# Patient Record
Sex: FEMALE | Race: WHITE | Employment: OTHER
[De-identification: names, ages, dates, MRNs, and addresses within clinical notes are randomized per-mention and may not be internally consistent; named-entity substitution may affect disease eponyms.]

---

## 2019-11-26 ENCOUNTER — TELEPHONE (OUTPATIENT)
Dept: OTHER | Facility: CLINIC | Age: 84
End: 2019-11-26

## 2019-11-26 ENCOUNTER — NURSE TRIAGE (OUTPATIENT)
Dept: OTHER | Facility: CLINIC | Age: 84
End: 2019-11-26

## 2021-03-29 RX ORDER — ALLOPURINOL 300 MG/1
300 TABLET ORAL DAILY
COMMUNITY

## 2021-03-29 RX ORDER — ASPIRIN 81 MG/1
81 TABLET ORAL DAILY
COMMUNITY

## 2021-03-29 RX ORDER — LANOLIN ALCOHOL/MO/W.PET/CERES
325 CREAM (GRAM) TOPICAL 2 TIMES DAILY
Status: ON HOLD | COMMUNITY
End: 2021-06-14

## 2021-03-29 RX ORDER — ACETAMINOPHEN 325 MG/1
650 TABLET ORAL EVERY 6 HOURS PRN
COMMUNITY

## 2021-03-29 RX ORDER — LISINOPRIL 20 MG/1
20 TABLET ORAL DAILY
COMMUNITY

## 2021-03-29 RX ORDER — ONDANSETRON 4 MG/1
4 TABLET, FILM COATED ORAL EVERY 8 HOURS PRN
COMMUNITY

## 2021-03-29 RX ORDER — METOPROLOL SUCCINATE 50 MG/1
50 TABLET, EXTENDED RELEASE ORAL DAILY
COMMUNITY

## 2021-03-29 RX ORDER — M-VIT,TX,IRON,MINS/CALC/FOLIC 27MG-0.4MG
1 TABLET ORAL DAILY
COMMUNITY

## 2021-03-29 RX ORDER — ROSUVASTATIN CALCIUM 5 MG/1
5 TABLET, COATED ORAL DAILY
COMMUNITY

## 2021-03-29 NOTE — PROGRESS NOTES
NPO after midnight except sip of water with heart/BP meds  Follow all instructions given by surgeon including medications to hold  Bring insurance card and photo ID  Shower the night before or morning of procedure with liquid antibacterial soap  Wear comfortable clothing  Do not bring jewelry or valuables  Bring list of medications with dosage and how often taken if not reviewed   needed at discharge at least 25years old  Call PAT at [phone] for questions    Instructed to call surgery center at [phone] upon arrival to speak with  before entering building. Covid screen due  at [facility] 6 to 7 days before procedure. Pt plans to have completed on 3/29 at [facility] - will send out - requested they fax us results. Fax number for PAT given. Covid screening questionnaire complete and negative for symptoms or exposure see chart for documentation     Requested nurse caring for pt morning of surgery send current MAR with last doses of medications listed. Kendal Alexandra RN voiced understanding.

## 2021-03-30 NOTE — PROGRESS NOTES
Ailin at [address] [place] [address],[address] office informed of Hgb 9.4 and Hct 29.9 drawn on 3/12/21. Stated she would inform DR Olidna Arthur of these levels.

## 2021-03-30 NOTE — PROGRESS NOTES
Spoke with pt's son Dani Leyden who stated that [address] home will transport pt to and from surgery on 4/5/21. Stated that he and his sister Timo Ch would be here with their mother as well. Corey Ear to call [phone]  upon arrival to the Surgery Center. Voiced understanding.

## 2021-04-05 ENCOUNTER — ANESTHESIA EVENT (OUTPATIENT)
Dept: OPERATING ROOM | Age: 86
End: 2021-04-05
Payer: MEDICARE

## 2021-04-05 ENCOUNTER — ANESTHESIA (OUTPATIENT)
Dept: OPERATING ROOM | Age: 86
End: 2021-04-05
Payer: MEDICARE

## 2021-04-05 ENCOUNTER — HOSPITAL ENCOUNTER (OUTPATIENT)
Age: 86
Setting detail: OUTPATIENT SURGERY
Discharge: NURSING, REHAB OR LONG-TERM CARE | End: 2021-04-05
Attending: SPECIALIST | Admitting: SPECIALIST
Payer: MEDICARE

## 2021-04-05 VITALS
SYSTOLIC BLOOD PRESSURE: 187 MMHG | OXYGEN SATURATION: 100 % | DIASTOLIC BLOOD PRESSURE: 81 MMHG | RESPIRATION RATE: 13 BRPM

## 2021-04-05 VITALS
HEART RATE: 75 BPM | BODY MASS INDEX: 21.97 KG/M2 | WEIGHT: 109 LBS | OXYGEN SATURATION: 93 % | HEIGHT: 59 IN | TEMPERATURE: 97 F | SYSTOLIC BLOOD PRESSURE: 149 MMHG | RESPIRATION RATE: 17 BRPM | DIASTOLIC BLOOD PRESSURE: 67 MMHG

## 2021-04-05 PROCEDURE — 6360000002 HC RX W HCPCS: Performed by: NURSE ANESTHETIST, CERTIFIED REGISTERED

## 2021-04-05 PROCEDURE — 3700000001 HC ADD 15 MINUTES (ANESTHESIA): Performed by: SPECIALIST

## 2021-04-05 PROCEDURE — 2500000003 HC RX 250 WO HCPCS: Performed by: SPECIALIST

## 2021-04-05 PROCEDURE — 3700000000 HC ANESTHESIA ATTENDED CARE: Performed by: SPECIALIST

## 2021-04-05 PROCEDURE — 7100000000 HC PACU RECOVERY - FIRST 15 MIN: Performed by: SPECIALIST

## 2021-04-05 PROCEDURE — 2500000003 HC RX 250 WO HCPCS: Performed by: NURSE ANESTHETIST, CERTIFIED REGISTERED

## 2021-04-05 PROCEDURE — 3600000003 HC SURGERY LEVEL 3 BASE: Performed by: SPECIALIST

## 2021-04-05 PROCEDURE — 6370000000 HC RX 637 (ALT 250 FOR IP): Performed by: ANESTHESIOLOGY

## 2021-04-05 PROCEDURE — 2500000003 HC RX 250 WO HCPCS: Performed by: ANESTHESIOLOGY

## 2021-04-05 PROCEDURE — 7100000011 HC PHASE II RECOVERY - ADDTL 15 MIN: Performed by: SPECIALIST

## 2021-04-05 PROCEDURE — 3600000013 HC SURGERY LEVEL 3 ADDTL 15MIN: Performed by: SPECIALIST

## 2021-04-05 PROCEDURE — 2580000003 HC RX 258: Performed by: SPECIALIST

## 2021-04-05 PROCEDURE — 7100000010 HC PHASE II RECOVERY - FIRST 15 MIN: Performed by: SPECIALIST

## 2021-04-05 PROCEDURE — 7100000001 HC PACU RECOVERY - ADDTL 15 MIN: Performed by: SPECIALIST

## 2021-04-05 PROCEDURE — 2709999900 HC NON-CHARGEABLE SUPPLY: Performed by: SPECIALIST

## 2021-04-05 RX ORDER — SUCCINYLCHOLINE/SOD CL,ISO/PF 200MG/10ML
SYRINGE (ML) INTRAVENOUS PRN
Status: DISCONTINUED | OUTPATIENT
Start: 2021-04-05 | End: 2021-04-05 | Stop reason: SDUPTHER

## 2021-04-05 RX ORDER — FENTANYL CITRATE 50 UG/ML
50 INJECTION, SOLUTION INTRAMUSCULAR; INTRAVENOUS EVERY 5 MIN PRN
Status: DISCONTINUED | OUTPATIENT
Start: 2021-04-05 | End: 2021-04-05 | Stop reason: HOSPADM

## 2021-04-05 RX ORDER — LIDOCAINE HYDROCHLORIDE 20 MG/ML
INJECTION, SOLUTION EPIDURAL; INFILTRATION; INTRACAUDAL; PERINEURAL PRN
Status: DISCONTINUED | OUTPATIENT
Start: 2021-04-05 | End: 2021-04-05 | Stop reason: SDUPTHER

## 2021-04-05 RX ORDER — FENTANYL CITRATE 50 UG/ML
INJECTION, SOLUTION INTRAMUSCULAR; INTRAVENOUS PRN
Status: DISCONTINUED | OUTPATIENT
Start: 2021-04-05 | End: 2021-04-05 | Stop reason: SDUPTHER

## 2021-04-05 RX ORDER — DOCUSATE SODIUM 100 MG/1
100 CAPSULE, LIQUID FILLED ORAL 2 TIMES DAILY
COMMUNITY

## 2021-04-05 RX ORDER — SODIUM CHLORIDE 9 MG/ML
INJECTION, SOLUTION INTRAVENOUS ONCE
Status: COMPLETED | OUTPATIENT
Start: 2021-04-05 | End: 2021-04-05

## 2021-04-05 RX ORDER — PROMETHAZINE HYDROCHLORIDE 25 MG/ML
12.5 INJECTION, SOLUTION INTRAMUSCULAR; INTRAVENOUS
Status: DISCONTINUED | OUTPATIENT
Start: 2021-04-05 | End: 2021-04-05 | Stop reason: HOSPADM

## 2021-04-05 RX ORDER — DEXAMETHASONE SODIUM PHOSPHATE 10 MG/ML
INJECTION, EMULSION INTRAMUSCULAR; INTRAVENOUS PRN
Status: DISCONTINUED | OUTPATIENT
Start: 2021-04-05 | End: 2021-04-05 | Stop reason: SDUPTHER

## 2021-04-05 RX ORDER — LIDOCAINE HYDROCHLORIDE AND EPINEPHRINE 10; 10 MG/ML; UG/ML
INJECTION, SOLUTION INFILTRATION; PERINEURAL PRN
Status: DISCONTINUED | OUTPATIENT
Start: 2021-04-05 | End: 2021-04-05 | Stop reason: ALTCHOICE

## 2021-04-05 RX ORDER — LABETALOL 20 MG/4 ML (5 MG/ML) INTRAVENOUS SYRINGE
10 EVERY 10 MIN PRN
Status: DISCONTINUED | OUTPATIENT
Start: 2021-04-05 | End: 2021-04-05 | Stop reason: HOSPADM

## 2021-04-05 RX ORDER — DOXYCYCLINE HYCLATE 50 MG/1
324 CAPSULE, GELATIN COATED ORAL
Status: ON HOLD | COMMUNITY
End: 2021-06-14

## 2021-04-05 RX ORDER — CEFAZOLIN SODIUM 1 G/3ML
INJECTION, POWDER, FOR SOLUTION INTRAMUSCULAR; INTRAVENOUS PRN
Status: DISCONTINUED | OUTPATIENT
Start: 2021-04-05 | End: 2021-04-05 | Stop reason: SDUPTHER

## 2021-04-05 RX ORDER — FENTANYL CITRATE 50 UG/ML
25 INJECTION, SOLUTION INTRAMUSCULAR; INTRAVENOUS EVERY 5 MIN PRN
Status: DISCONTINUED | OUTPATIENT
Start: 2021-04-05 | End: 2021-04-05 | Stop reason: HOSPADM

## 2021-04-05 RX ORDER — PROPOFOL 10 MG/ML
INJECTION, EMULSION INTRAVENOUS PRN
Status: DISCONTINUED | OUTPATIENT
Start: 2021-04-05 | End: 2021-04-05 | Stop reason: SDUPTHER

## 2021-04-05 RX ORDER — CLINDAMYCIN HYDROCHLORIDE 300 MG/1
300 CAPSULE ORAL 3 TIMES DAILY
Status: ON HOLD | COMMUNITY
End: 2021-06-14

## 2021-04-05 RX ORDER — LANOLIN ALCOHOL/MO/W.PET/CERES
3 CREAM (GRAM) TOPICAL DAILY
COMMUNITY

## 2021-04-05 RX ORDER — SCOLOPAMINE TRANSDERMAL SYSTEM 1 MG/1
1 PATCH, EXTENDED RELEASE TRANSDERMAL ONCE
Status: DISCONTINUED | OUTPATIENT
Start: 2021-04-05 | End: 2021-04-05 | Stop reason: HOSPADM

## 2021-04-05 RX ORDER — ONDANSETRON 2 MG/ML
INJECTION INTRAMUSCULAR; INTRAVENOUS PRN
Status: DISCONTINUED | OUTPATIENT
Start: 2021-04-05 | End: 2021-04-05 | Stop reason: SDUPTHER

## 2021-04-05 RX ADMIN — Medication 100 MG: at 11:33

## 2021-04-05 RX ADMIN — FENTANYL CITRATE 25 MCG: 50 INJECTION, SOLUTION INTRAMUSCULAR; INTRAVENOUS at 12:07

## 2021-04-05 RX ADMIN — DEXAMETHASONE SODIUM PHOSPHATE 8 MG: 10 INJECTION, EMULSION INTRAMUSCULAR; INTRAVENOUS at 11:57

## 2021-04-05 RX ADMIN — PROPOFOL 40 MG: 10 INJECTION, EMULSION INTRAVENOUS at 12:04

## 2021-04-05 RX ADMIN — FENTANYL CITRATE 25 MCG: 50 INJECTION, SOLUTION INTRAMUSCULAR; INTRAVENOUS at 11:29

## 2021-04-05 RX ADMIN — LABETALOL 20 MG/4 ML (5 MG/ML) INTRAVENOUS SYRINGE 10 MG: at 12:38

## 2021-04-05 RX ADMIN — LIDOCAINE HYDROCHLORIDE 60 MG: 20 INJECTION, SOLUTION EPIDURAL; INFILTRATION; INTRACAUDAL; PERINEURAL at 11:32

## 2021-04-05 RX ADMIN — ONDANSETRON HYDROCHLORIDE 4 MG: 4 INJECTION, SOLUTION INTRAMUSCULAR; INTRAVENOUS at 11:57

## 2021-04-05 RX ADMIN — FENTANYL CITRATE 25 MCG: 50 INJECTION, SOLUTION INTRAMUSCULAR; INTRAVENOUS at 11:46

## 2021-04-05 RX ADMIN — CEFAZOLIN 2000 MG: 1 INJECTION, POWDER, FOR SOLUTION INTRAMUSCULAR; INTRAVENOUS; PARENTERAL at 11:39

## 2021-04-05 RX ADMIN — SODIUM CHLORIDE: 9 INJECTION, SOLUTION INTRAVENOUS at 11:26

## 2021-04-05 RX ADMIN — PROPOFOL 70 MG: 10 INJECTION, EMULSION INTRAVENOUS at 11:33

## 2021-04-05 RX ADMIN — FENTANYL CITRATE 25 MCG: 50 INJECTION, SOLUTION INTRAMUSCULAR; INTRAVENOUS at 12:23

## 2021-04-05 ASSESSMENT — PULMONARY FUNCTION TESTS
PIF_VALUE: 14
PIF_VALUE: 15
PIF_VALUE: 60
PIF_VALUE: 20
PIF_VALUE: 15
PIF_VALUE: 0
PIF_VALUE: 1
PIF_VALUE: 15
PIF_VALUE: 0
PIF_VALUE: 15
PIF_VALUE: 9
PIF_VALUE: 15
PIF_VALUE: 1
PIF_VALUE: 15
PIF_VALUE: 3
PIF_VALUE: 15
PIF_VALUE: 13

## 2021-04-05 ASSESSMENT — PAIN - FUNCTIONAL ASSESSMENT: PAIN_FUNCTIONAL_ASSESSMENT: 0-10

## 2021-04-05 NOTE — PROGRESS NOTES
1234: Patient to Phase I Recovery via bed in stable condition on non-rebreather mask. BP: 200/86, HR: 79, RR: 13, SPO2: 100%, Temp: 97. Head wrap in place. Clean, dry, & intact at this time. IV infusing. SCDs on bilateral lower extremities. 1238: BP: 204/90 - 10mg IV Labetalol given. 1242: BP: 199/85    1245: BP: 159/69, HR: 76, RR: 14, SPO2: 96% on 2L nasal cannula. 1250: BP: 149/68, HR: 74, RR: 15, SPO2: 97% on 1L nasal cannula. 1251: Patient on room air at this time. Patient coughing up sputum, but swallowing it. Patient alert & oriented x4. Denies pain at this time. Hand grasps equal bilaterally. Respirations equal and unlabored. Rhonchi present. Patient coughing. Pedal push/pull intact. Right side weaker than the left. 1255: Patient resting at this time. States pain is a 5/10 on her head, but states it is tolerable. BP: 160/69, HR: 75, RR: 14, SPO2: 95% on room air. 1300:  BP: 150/68, HR: 74, RR: 14, SPO2: 95% on room air.    1305: BP: 149/67, HR: 75, RR: 17, SPO2: 93%    1308: Patient to Phase II Recovery in stable condition on room air via bed at this time.

## 2021-04-05 NOTE — ANESTHESIA PRE PROCEDURE
Department of Anesthesiology  Preprocedure Note       Name:  Aleja Luo   Age:  80 y.o.  :  1933                                          MRN:  [id number]         Date:  2021      Surgeon: Oscar Mahoney):  Robert Heller MD    Procedure: Procedure(s):  SCALP REPAIR FLAP    Medications prior to admission:   Prior to Admission medications    Medication Sig Start Date End Date Taking?  Authorizing Provider   docusate sodium (COLACE) 100 MG capsule Take 100 mg by mouth 2 times daily   Yes Historical Provider, MD   melatonin 3 MG TABS tablet Take 3 mg by mouth daily   Yes Historical Provider, MD   ferrous gluconate (FERGON) 324 (38 Fe) MG tablet Take 324 mg by mouth daily (with breakfast)   Yes Historical Provider, MD   clindamycin (CLEOCIN) 300 MG capsule Take 300 mg by mouth 3 times daily   Yes Historical Provider, MD   allopurinol (ZYLOPRIM) 300 MG tablet Take 300 mg by mouth daily   Yes Historical Provider, MD   aspirin 81 MG EC tablet Take 81 mg by mouth daily   Yes Historical Provider, MD   Multiple Vitamins-Minerals (THERAPEUTIC MULTIVITAMIN-MINERALS) tablet Take 1 tablet by mouth daily   Yes Historical Provider, MD   ferrous sulfate (FE TABS 325) 325 (65 Fe) MG EC tablet Take 325 mg by mouth 2 times daily   Yes Historical Provider, MD   lisinopril (PRINIVIL;ZESTRIL) 20 MG tablet Take 20 mg by mouth daily   Yes Historical Provider, MD   metoprolol succinate (TOPROL XL) 50 MG extended release tablet Take 50 mg by mouth daily   Yes Historical Provider, MD   Omeprazole 20 MG TBDD Take 20 mg by mouth daily   Yes Historical Provider, MD   rosuvastatin (CRESTOR) 5 MG tablet Take 5 mg by mouth daily   Yes Historical Provider, MD   acetaminophen (TYLENOL) 325 MG tablet Take 650 mg by mouth every 6 hours as needed for Pain   Yes Historical Provider, MD   ondansetron (ZOFRAN) 4 MG tablet Take 4 mg by mouth every 8 hours as needed for Nausea or Vomiting   Yes Historical Provider, MD       Current medications: Current Facility-Administered Medications   Medication Dose Route Frequency Provider Last Rate Last Admin    0.9 % sodium chloride infusion   Intravenous Once Charito Ojeda MD        ceFAZolin (ANCEF) 2000 mg in dextrose 5 % 50 mL IVPB  2,000 mg Intravenous 60 Min Pre-Op Charito Ojeda MD        scopolamine (TRANSDERM-SCOP) transdermal patch 1 patch  1 patch Transdermal Once Flori Epps DO           Allergies: Allergies   Allergen Reactions    Morphine Anaphylaxis     Not sure of reaction        Problem List:  There is no problem list on file for this patient. Past Medical History:        Diagnosis Date    Arthritis     Cancer ([address] 2021    skin     GERD (gastroesophageal reflux disease)     Hyperlipidemia     Hypertension     Scoliosis        Past Surgical History:        Procedure Laterality Date    FRACTURE SURGERY Right     hip     FRACTURE SURGERY Left     femur    HERNIA REPAIR         Social History:    Social History     Tobacco Use    Smoking status: Not on file   Substance Use Topics    Alcohol use: Not Currently                                Counseling given: Not Answered      Vital Signs (Current):   Vitals:    03/29/21 1434 03/30/21 1014 04/05/21 1013   BP:   (!) 173/77   Pulse:   77   Resp:   16   Temp:   97.1 °F (36.2 °C)   TempSrc:   Temporal   SpO2:   95%   Weight: 109 lb (49.4 kg)  109 lb (49.4 kg)   Height:  4' 11\" (1.499 m) 4' 11\" (1.499 m)                                              BP Readings from Last 3 Encounters:   04/05/21 (!) 173/77       NPO Status: Time of last liquid consumption: 1800                        Time of last solid consumption: 1800                        Date of last liquid consumption: 04/04/21                        Date of last solid food consumption: 04/04/21    BMI:   Wt Readings from Last 3 Encounters:   04/05/21 109 lb (49.4 kg)     Body mass index is 22.02 kg/m².     CBC: No results found for: WBC, RBC, HGB, HCT, MCV, RDW, PLT    CMP: No results found for: NA, K, CL, CO2, BUN, CREATININE, GFRAA, AGRATIO, LABGLOM, GLUCOSE, PROT, CALCIUM, BILITOT, ALKPHOS, AST, ALT    POC Tests: No results for input(s): POCGLU, POCNA, POCK, POCCL, POCBUN, POCHEMO, POCHCT in the last 72 hours. Coags: No results found for: PROTIME, INR, APTT    HCG (If Applicable): No results found for: PREGTESTUR, PREGSERUM, HCG, HCGQUANT     ABGs: No results found for: PHART, PO2ART, YPZ8AUT, DDX1AUI, BEART, W1ZWQAFS     Type & Screen (If Applicable):  No results found for: LABABO, LABRH    Drug/Infectious Status (If Applicable):  No results found for: HIV, HEPCAB    COVID-19 Screening (If Applicable): No results found for: COVID19        Anesthesia Evaluation  Patient summary reviewed  Airway: Mallampati: III  TM distance: >3 FB   Neck ROM: full  Mouth opening: > = 3 FB Dental:    (+) partials      Pulmonary:                              Cardiovascular:    (+) hypertension:,       ECG reviewed                        Neuro/Psych:               GI/Hepatic/Renal:            ROS comment: Denies any sxs of gerd. Endo/Other:                     Abdominal:           Vascular:                                        Anesthesia Plan      general     ASA 2       Induction: intravenous. MIPS: Postoperative opioids intended and Prophylactic antiemetics administered. Anesthetic plan and risks discussed with patient and child/children. Plan discussed with LIZZY. Juan Manuel Gomes.  [facility]   4/5/2021

## 2021-04-05 NOTE — PROGRESS NOTES
1308-  Report received from [facility]. Patient given blueberry muffin and water. 1310-  Family brought to room. 1320-  All belongings in room. Dr. Carmina Hough in room talking with patient and family. 1330-  Report called to Nursing home. All questions answered. 1340-  Patient tolerating snack and drink. Denies pain. 1350-  IV removed-- no complications. Bandage applied. 1400-  This RN and Carlo Stacy RN changed patient's depends and pants. 1410-  Patient dressed. 1415-  Discharge instructions given to nurse and family. No further questions.     1418-  Patient discharged in stable condition with all belongings via wheelchair

## 2021-04-05 NOTE — OP NOTE
Operative Note    Patient name: Jia Xavier Record Number: [id number]    Primary Care Physician: No primary care provider on file.  1933    Date of Procedure: 2021    Pre-operative Diagnosis: 20cm2 defect of anterior scalp wound s/p MOHS for squamous cell carcinoma    Post-operative Diagnosis: Same    Procedure Performed: Repair of anterior scalp defect with an adjacent tissue transfer (150 cm2) (CPT 38286, 14302 x 3)    Surgeons/Assistants: Dr. Sylvia Hay PA-C    Estimated Blood Loss: 5ml     Complications: none immediately appreciated    Procedure: With the patient lying in the supine position and under adequate anesthesia per the anesthesia team, the area was anesthetized with a total of 19 ml of 1% Lidocaine 1:100,000 with epinephrine solution. The area was then prepped and draped in the standard surgical fashion. There was a very sizeable defect with exposed cranium from previous operation, which could not be closed primarily. Therefore, an adjacent tissue transfer (bilateral rotation flaps) were then designed, elevated and inset with skin staples. The Burrow's triangles were resected to prevent dog-ear deformity and final closure was completed using bacitracin, xeroform with bulky sterile head wrap. The patient tolerated the procedure quite well and remained hemodynamically stable throughout the procedure and was quite comfortable throughout the operative course.     Clinical staging for cancer cases:  Ct  Cn  Cm    Pauline العلي  Electronically signed by me on 2021 at 12:28 PM  Operative Note      Patient: Mil Lozano  YOB: 1933  MRN: [id number]    Date of Procedure: 2021    Pre-Op Diagnosis: OPEN WOUND OF THE SCALP    Post-Op Diagnosis: Same       Procedure(s):  SCALP REPAIR FLAP    Surgeon(s):  Pauline العلي MD    Assistant:   Physician Assistant: Adilene Cortes PA-C    Anesthesia: General    Estimated Blood Loss (mL): Minimal    Complications: None    Specimens:   * No specimens in log *    Implants:  * No implants in log *      Drains: * No LDAs found *    Findings: 20cm2 defect of anterior scalp wound s/p MOHS for squamous cell carcinoma      Detailed Description of Procedure:   Repair of anterior scalp defect with an adjacent tissue transfer (150 cm2) (CPT 49172, 14302 x 3)    Electronically signed by Tarik Smith MD on 4/5/2021 at 12:28 PM

## 2021-04-05 NOTE — H&P
[facility]  History and Physical Update    Pt Name: Raleigh Blanco  MRN: [id number]  YOB: 1933  Date of evaluation: 4/5/2021    I have examined the patient and reviewed the H&P/Consult and there are no changes to the patient or plans.       Luis Antonio Blackwell  Electronically signed 4/5/2021 at 7:08 AM

## 2021-04-05 NOTE — ANESTHESIA POSTPROCEDURE EVALUATION
Department of Anesthesiology  Postprocedure Note    Patient: Tod Reeder  MRN: [id number]  YOB: 1933  Date of evaluation: 4/5/2021  Time:  1:09 PM     Procedure Summary     Date: 04/05/21 Room / Location: [address] [place] [address] 04 / [facility]    Anesthesia Start: 0696 Anesthesia Stop: 3307    Procedure: SCALP REPAIR FLAP (N/A ) Diagnosis: (OPEN WOUND OF THE SCALP)    Surgeons: Mitchel Aguila MD Responsible Provider: Yvonne Elias DO    Anesthesia Type: general ASA Status: 2          Anesthesia Type: general    Heydi Phase I: Heydi Score: 10    Heydi Phase II:      Last vitals: Reviewed and per EMR flowsheets.        Anesthesia Post Evaluation    Patient location during evaluation: floor  Patient participation: complete - patient participated  Level of consciousness: awake  Airway patency: patent  Nausea & Vomiting: no vomiting and no nausea  Cardiovascular status: hemodynamically stable  Respiratory status: acceptable  Hydration status: stable

## 2021-06-04 NOTE — PROGRESS NOTES
PAT:    Spoke with Greg Lo at [place] [address]. NPO after midnight except sip of water with heart/BP meds  Follow all instructions given by surgeon including medications to hold  Bring insurance card and photo ID  Shower the night before or morning of procedure with liquid antibacterial soap  Wear comfortable clothing  Do not bring jewelry or valuables  Bring list of medications with dosage and how often taken if not reviewed   needed at discharge at least 25years old  Call PAT at [phone] for questions    Instructed to call surgery center at [phone] upon arrival to speak with  before entering building. Covid screen due  at [facility] 6 to 7 days before procedure. Pt fully vaccinated and will not be tested. Covid screening questionnaire complete and negative for symptoms or exposure see chart for documentation    Spoke with pt's son Caitlyn Barba - he will be present the day of surgery. Questions answered. Voiced understanding.

## 2021-06-08 NOTE — PROGRESS NOTES
EKG clearance form given to Dr. Rosalina Luna for review. OK to proceed with surgery as planned at surgery center.

## 2021-06-14 ENCOUNTER — HOSPITAL ENCOUNTER (OUTPATIENT)
Age: 86
Setting detail: OUTPATIENT SURGERY
Discharge: OTHER INSTITUTION | End: 2021-06-14
Attending: SPECIALIST | Admitting: SPECIALIST
Payer: MEDICARE

## 2021-06-14 ENCOUNTER — ANESTHESIA EVENT (OUTPATIENT)
Dept: OPERATING ROOM | Age: 86
End: 2021-06-14
Payer: MEDICARE

## 2021-06-14 ENCOUNTER — ANESTHESIA (OUTPATIENT)
Dept: OPERATING ROOM | Age: 86
End: 2021-06-14
Payer: MEDICARE

## 2021-06-14 VITALS
TEMPERATURE: 98.6 F | OXYGEN SATURATION: 97 % | SYSTOLIC BLOOD PRESSURE: 150 MMHG | BODY MASS INDEX: 21.6 KG/M2 | HEART RATE: 83 BPM | WEIGHT: 110 LBS | HEIGHT: 60 IN | RESPIRATION RATE: 15 BRPM | DIASTOLIC BLOOD PRESSURE: 72 MMHG

## 2021-06-14 VITALS
SYSTOLIC BLOOD PRESSURE: 209 MMHG | OXYGEN SATURATION: 100 % | RESPIRATION RATE: 17 BRPM | DIASTOLIC BLOOD PRESSURE: 91 MMHG

## 2021-06-14 PROCEDURE — 6370000000 HC RX 637 (ALT 250 FOR IP): Performed by: PHYSICIAN ASSISTANT

## 2021-06-14 PROCEDURE — 3600000012 HC SURGERY LEVEL 2 ADDTL 15MIN: Performed by: SPECIALIST

## 2021-06-14 PROCEDURE — 6360000002 HC RX W HCPCS: Performed by: NURSE ANESTHETIST, CERTIFIED REGISTERED

## 2021-06-14 PROCEDURE — 2500000003 HC RX 250 WO HCPCS: Performed by: NURSE ANESTHETIST, CERTIFIED REGISTERED

## 2021-06-14 PROCEDURE — 7100000001 HC PACU RECOVERY - ADDTL 15 MIN: Performed by: SPECIALIST

## 2021-06-14 PROCEDURE — 3600000002 HC SURGERY LEVEL 2 BASE: Performed by: SPECIALIST

## 2021-06-14 PROCEDURE — 2709999900 HC NON-CHARGEABLE SUPPLY: Performed by: SPECIALIST

## 2021-06-14 PROCEDURE — 2500000003 HC RX 250 WO HCPCS: Performed by: SPECIALIST

## 2021-06-14 PROCEDURE — 3700000001 HC ADD 15 MINUTES (ANESTHESIA): Performed by: SPECIALIST

## 2021-06-14 PROCEDURE — 3700000000 HC ANESTHESIA ATTENDED CARE: Performed by: SPECIALIST

## 2021-06-14 PROCEDURE — 6360000002 HC RX W HCPCS: Performed by: ANESTHESIOLOGY

## 2021-06-14 PROCEDURE — 7100000010 HC PHASE II RECOVERY - FIRST 15 MIN: Performed by: SPECIALIST

## 2021-06-14 PROCEDURE — 2580000003 HC RX 258: Performed by: SPECIALIST

## 2021-06-14 PROCEDURE — 7100000011 HC PHASE II RECOVERY - ADDTL 15 MIN: Performed by: SPECIALIST

## 2021-06-14 PROCEDURE — 7100000000 HC PACU RECOVERY - FIRST 15 MIN: Performed by: SPECIALIST

## 2021-06-14 PROCEDURE — 6360000002 HC RX W HCPCS: Performed by: SPECIALIST

## 2021-06-14 RX ORDER — PROPOFOL 10 MG/ML
INJECTION, EMULSION INTRAVENOUS PRN
Status: DISCONTINUED | OUTPATIENT
Start: 2021-06-14 | End: 2021-06-14 | Stop reason: SDUPTHER

## 2021-06-14 RX ORDER — ONDANSETRON 2 MG/ML
INJECTION INTRAMUSCULAR; INTRAVENOUS PRN
Status: DISCONTINUED | OUTPATIENT
Start: 2021-06-14 | End: 2021-06-14 | Stop reason: SDUPTHER

## 2021-06-14 RX ORDER — FENTANYL CITRATE 50 UG/ML
INJECTION, SOLUTION INTRAMUSCULAR; INTRAVENOUS
Status: DISCONTINUED
Start: 2021-06-14 | End: 2021-06-14 | Stop reason: HOSPADM

## 2021-06-14 RX ORDER — LIDOCAINE HYDROCHLORIDE AND EPINEPHRINE 10; 10 MG/ML; UG/ML
INJECTION, SOLUTION INFILTRATION; PERINEURAL PRN
Status: DISCONTINUED | OUTPATIENT
Start: 2021-06-14 | End: 2021-06-14 | Stop reason: ALTCHOICE

## 2021-06-14 RX ORDER — SODIUM CHLORIDE 9 MG/ML
INJECTION, SOLUTION INTRAVENOUS CONTINUOUS
Status: DISCONTINUED | OUTPATIENT
Start: 2021-06-14 | End: 2021-06-14 | Stop reason: HOSPADM

## 2021-06-14 RX ORDER — DEXAMETHASONE SODIUM PHOSPHATE 10 MG/ML
INJECTION, EMULSION INTRAMUSCULAR; INTRAVENOUS PRN
Status: DISCONTINUED | OUTPATIENT
Start: 2021-06-14 | End: 2021-06-14 | Stop reason: SDUPTHER

## 2021-06-14 RX ORDER — FENTANYL CITRATE 50 UG/ML
50 INJECTION, SOLUTION INTRAMUSCULAR; INTRAVENOUS ONCE
Status: COMPLETED | OUTPATIENT
Start: 2021-06-14 | End: 2021-06-14

## 2021-06-14 RX ORDER — SUCCINYLCHOLINE/SOD CL,ISO/PF 200MG/10ML
SYRINGE (ML) INTRAVENOUS PRN
Status: DISCONTINUED | OUTPATIENT
Start: 2021-06-14 | End: 2021-06-14 | Stop reason: SDUPTHER

## 2021-06-14 RX ORDER — TRAMADOL HYDROCHLORIDE 50 MG/1
50 TABLET ORAL ONCE
Status: COMPLETED | OUTPATIENT
Start: 2021-06-14 | End: 2021-06-14

## 2021-06-14 RX ORDER — FENTANYL CITRATE 50 UG/ML
INJECTION, SOLUTION INTRAMUSCULAR; INTRAVENOUS PRN
Status: DISCONTINUED | OUTPATIENT
Start: 2021-06-14 | End: 2021-06-14 | Stop reason: SDUPTHER

## 2021-06-14 RX ORDER — LIDOCAINE HYDROCHLORIDE 20 MG/ML
INJECTION, SOLUTION EPIDURAL; INFILTRATION; INTRACAUDAL; PERINEURAL PRN
Status: DISCONTINUED | OUTPATIENT
Start: 2021-06-14 | End: 2021-06-14 | Stop reason: SDUPTHER

## 2021-06-14 RX ORDER — ROCURONIUM BROMIDE 10 MG/ML
INJECTION, SOLUTION INTRAVENOUS PRN
Status: DISCONTINUED | OUTPATIENT
Start: 2021-06-14 | End: 2021-06-14 | Stop reason: SDUPTHER

## 2021-06-14 RX ADMIN — ONDANSETRON HYDROCHLORIDE 4 MG: 4 INJECTION, SOLUTION INTRAMUSCULAR; INTRAVENOUS at 11:19

## 2021-06-14 RX ADMIN — TRAMADOL HYDROCHLORIDE 50 MG: 50 TABLET ORAL at 13:32

## 2021-06-14 RX ADMIN — Medication 100 MG: at 10:59

## 2021-06-14 RX ADMIN — SUGAMMADEX 200 MG: 100 INJECTION, SOLUTION INTRAVENOUS at 11:27

## 2021-06-14 RX ADMIN — FENTANYL CITRATE 25 MCG: 50 INJECTION, SOLUTION INTRAMUSCULAR; INTRAVENOUS at 11:31

## 2021-06-14 RX ADMIN — FENTANYL CITRATE 50 MCG: 50 INJECTION, SOLUTION INTRAMUSCULAR; INTRAVENOUS at 11:43

## 2021-06-14 RX ADMIN — FENTANYL CITRATE 25 MCG: 50 INJECTION, SOLUTION INTRAMUSCULAR; INTRAVENOUS at 11:05

## 2021-06-14 RX ADMIN — SODIUM CHLORIDE: 9 INJECTION, SOLUTION INTRAVENOUS at 10:54

## 2021-06-14 RX ADMIN — ROCURONIUM BROMIDE 15 MG: 10 INJECTION INTRAVENOUS at 11:05

## 2021-06-14 RX ADMIN — CEFAZOLIN SODIUM 2000 MG: 10 INJECTION, POWDER, FOR SOLUTION INTRAVENOUS at 11:06

## 2021-06-14 RX ADMIN — LIDOCAINE HYDROCHLORIDE 60 MG: 20 INJECTION, SOLUTION EPIDURAL; INFILTRATION; INTRACAUDAL; PERINEURAL at 10:57

## 2021-06-14 RX ADMIN — PROPOFOL 100 MG: 10 INJECTION, EMULSION INTRAVENOUS at 10:58

## 2021-06-14 RX ADMIN — DEXAMETHASONE SODIUM PHOSPHATE 10 MG: 10 INJECTION, EMULSION INTRAMUSCULAR; INTRAVENOUS at 11:15

## 2021-06-14 RX ADMIN — FENTANYL CITRATE 25 MCG: 50 INJECTION, SOLUTION INTRAMUSCULAR; INTRAVENOUS at 11:16

## 2021-06-14 RX ADMIN — FENTANYL CITRATE 25 MCG: 50 INJECTION, SOLUTION INTRAMUSCULAR; INTRAVENOUS at 10:55

## 2021-06-14 ASSESSMENT — PULMONARY FUNCTION TESTS
PIF_VALUE: 1
PIF_VALUE: 15
PIF_VALUE: 16
PIF_VALUE: 14
PIF_VALUE: 4
PIF_VALUE: 9
PIF_VALUE: 1
PIF_VALUE: 1
PIF_VALUE: 4
PIF_VALUE: 17
PIF_VALUE: 16
PIF_VALUE: 14
PIF_VALUE: 16
PIF_VALUE: 17
PIF_VALUE: 1
PIF_VALUE: 3
PIF_VALUE: 12
PIF_VALUE: 17
PIF_VALUE: 17
PIF_VALUE: 16
PIF_VALUE: 14
PIF_VALUE: 17
PIF_VALUE: 17
PIF_VALUE: 16
PIF_VALUE: 18
PIF_VALUE: 15
PIF_VALUE: 4
PIF_VALUE: 16
PIF_VALUE: 1
PIF_VALUE: 16
PIF_VALUE: 17
PIF_VALUE: 14
PIF_VALUE: 15
PIF_VALUE: 1
PIF_VALUE: 17
PIF_VALUE: 17
PIF_VALUE: 15
PIF_VALUE: 4

## 2021-06-14 ASSESSMENT — PAIN SCALES - GENERAL: PAINLEVEL_OUTOF10: 8

## 2021-06-14 NOTE — ANESTHESIA PRE PROCEDURE
Department of Anesthesiology  Preprocedure Note       Name:  Home Mckeon   Age:  80 y.o.  :  1933                                          MRN:  [id number]         Date:  2021      Surgeon: Olivier Sadler):  Deniz Bull MD    Procedure: Procedure(s):  REVISION OF WOUND AND READVANCEMENT OF FLAP OF SCALP    Medications prior to admission:   Prior to Admission medications    Medication Sig Start Date End Date Taking?  Authorizing Provider   docusate sodium (COLACE) 100 MG capsule Take 100 mg by mouth 2 times daily   Yes Historical Provider, MD   allopurinol (ZYLOPRIM) 300 MG tablet Take 300 mg by mouth daily   Yes Historical Provider, MD   lisinopril (PRINIVIL;ZESTRIL) 20 MG tablet Take 20 mg by mouth daily   Yes Historical Provider, MD   metoprolol succinate (TOPROL XL) 50 MG extended release tablet Take 50 mg by mouth daily   Yes Historical Provider, MD   Omeprazole 20 MG TBDD Take 20 mg by mouth daily   Yes Historical Provider, MD   rosuvastatin (CRESTOR) 5 MG tablet Take 5 mg by mouth daily   Yes Historical Provider, MD   acetaminophen (TYLENOL) 325 MG tablet Take 650 mg by mouth every 6 hours as needed for Pain   Yes Historical Provider, MD   ondansetron (ZOFRAN) 4 MG tablet Take 4 mg by mouth every 8 hours as needed for Nausea or Vomiting   Yes Historical Provider, MD   melatonin 3 MG TABS tablet Take 3 mg by mouth daily    Historical Provider, MD   aspirin 81 MG EC tablet Take 81 mg by mouth daily    Historical Provider, MD   Multiple Vitamins-Minerals (THERAPEUTIC MULTIVITAMIN-MINERALS) tablet Take 1 tablet by mouth daily    Historical Provider, MD       Current medications:    Current Facility-Administered Medications   Medication Dose Route Frequency Provider Last Rate Last Admin    0.9 % sodium chloride infusion   Intravenous Continuous Deniz Bull MD        ceFAZolin (ANCEF) 2000 mg in dextrose 5 % 50 mL IVPB  2,000 mg Intravenous 60 Min Pre-Op Deniz Bull MD           Allergies: POCHCT in the last 72 hours. Coags: No results found for: PROTIME, INR, APTT    HCG (If Applicable): No results found for: PREGTESTUR, PREGSERUM, HCG, HCGQUANT     ABGs: No results found for: PHART, PO2ART, POG7UTV, MPQ3YLZ, BEART, C0NDOSXQ     Type & Screen (If Applicable):  No results found for: LABABO, LABRH    Drug/Infectious Status (If Applicable):  No results found for: HIV, HEPCAB    COVID-19 Screening (If Applicable): No results found for: COVID19        Anesthesia Evaluation    Airway: Mallampati: II        Dental:          Pulmonary:                              Cardiovascular:    (+) hypertension:,                   Neuro/Psych:               GI/Hepatic/Renal:   (+) GERD:,           Endo/Other:                     Abdominal:           Vascular:                                        Anesthesia Plan      general     ASA 4       Induction: intravenous. Anesthetic plan and risks discussed with patient. Plan discussed with CRNA.                   Sarkis Arguello MD   6/14/2021

## 2021-06-14 NOTE — H&P
[facility]  History and Physical Update    Pt Name: Bulmaro Trujillo  MRN: [id number]  YOB: 1933  Date of evaluation: 6/14/2021    I have examined the patient and reviewed the H&P/Consult and there are no changes to the patient or plans.       Harsh Dawn MD  Electronically signed 6/14/2021 at 6:46 AM

## 2021-06-14 NOTE — OP NOTE
General    Estimated Blood Loss (mL): Minimal    Complications: None    Specimens:   * No specimens in log *    Implants:  * No implants in log *      Drains: * No LDAs found *    Findings: Complex 12cm2 postoperative wound of scalp    Detailed Description of Procedure:   Repair of scalp wound with an adjacent tissue transfer (60 cm2) (CPT 54412)      Electronically signed by Kofi Jang MD on 6/14/2021 at 11:34 AM

## 2021-06-14 NOTE — PROGRESS NOTES
1138-  Patient arrived to pacu via cart to [place] [facility]. Spontaneous respirations even and unlabored. Placed on monitor-- BP elevated. All other VSS. Report received from [facility] and [address].   1777-  Assessment completed. Patient is tearful and states \"it hurts\". ACE wrap to head--clean and dry. IV infusing 0.9-- no complications. Patient has scopolamine patch in place. Patient denies N/V--will monitor. SCDs applied. 0-  Dr. Barrientos Apo states patient may have Fentanyl. 50mcgs of Fentanyl given. See MAR.   1148-  Respirations even and unlabored. VSS.   1150-  BP trending down--will monitor. 1155-  Patient appears more comfortable. Sleeping at this time. 1200-  Patient sleeping. Respirations even and unlabored. VSS.   1205-  Patient continues to rest.  VSS.   1210-  Reassessment completed. Patient meets criteria to be moved to phase II.    1215-  Family brought to room. Patient remains sleepy. Pulse ox remains on and stable. Muffin and water given to patient. Patient will be medicated after she wakes up and eats. Family and patient agree. 1225-  All belongings in room. 1230-  Report given to 2 Giovani Pierson RN while this RN at lunch. 1250-  Report received back. Patient doing well. Continues to sleep. No needs at this time. 1305-  Patient waking up more. This RN sat patient up. Family states they will help her eat. 1315-  Patient doing well. Denies needs at this time. 1332-  Patient medicated with Tramadol via Kaitlin SMITH. 1345-  Patient sleeping. 1355-  Patient states pain 4/10 and tolerable at this time. 1405-  IV removed-- no complications. Bandage applied. This RN helped patient dress. 26-  Waiting for nurse from Nursing home to get here for discharge. 1425-  Prescription given to family to fill. 1435-  Bruising developed around IV site. Pressure held. Cool compress applied. 1450-  Report given to nurse from nursing home. Understanding verbalized.     1500- Patient discharged in stable condition with all belongings via wheelchair.

## 2021-09-05 LAB
BUN BLDV-MCNC: 46 MG/DL
CALCIUM SERPL-MCNC: 9.9 MG/DL
CHLORIDE BLD-SCNC: 116 MMOL/L
CO2: 13 MMOL/L
CREAT SERPL-MCNC: 1.4 MG/DL
GFR CALCULATED: 38
GLUCOSE BLD-MCNC: 120 MG/DL
POTASSIUM SERPL-SCNC: 5.9 MMOL/L
SODIUM BLD-SCNC: 142 MMOL/L

## 2021-09-21 DIAGNOSIS — N18.30 STAGE 3 CHRONIC KIDNEY DISEASE, UNSPECIFIED WHETHER STAGE 3A OR 3B CKD (HCC): Primary | ICD-10-CM

## 2022-09-13 ENCOUNTER — HOSPITAL ENCOUNTER (INPATIENT)
Age: 87
LOS: 6 days | Discharge: NURSING, REHAB OR LONG-TERM CARE | DRG: 389 | End: 2022-09-19
Attending: SURGERY | Admitting: SURGERY
Payer: MEDICARE

## 2022-09-13 DIAGNOSIS — K56.609 SMALL BOWEL OBSTRUCTION (HCC): ICD-10-CM

## 2022-09-13 DIAGNOSIS — R53.81 PHYSICAL DECONDITIONING: Primary | ICD-10-CM

## 2022-09-13 PROCEDURE — 2580000003 HC RX 258: Performed by: NURSE PRACTITIONER

## 2022-09-13 PROCEDURE — 6360000002 HC RX W HCPCS: Performed by: NURSE PRACTITIONER

## 2022-09-13 PROCEDURE — 1200000000 HC SEMI PRIVATE

## 2022-09-13 PROCEDURE — 99222 1ST HOSP IP/OBS MODERATE 55: CPT | Performed by: SURGERY

## 2022-09-13 PROCEDURE — C9113 INJ PANTOPRAZOLE SODIUM, VIA: HCPCS | Performed by: NURSE PRACTITIONER

## 2022-09-13 PROCEDURE — A4216 STERILE WATER/SALINE, 10 ML: HCPCS | Performed by: NURSE PRACTITIONER

## 2022-09-13 RX ORDER — HYDRALAZINE HYDROCHLORIDE 20 MG/ML
10 INJECTION INTRAMUSCULAR; INTRAVENOUS EVERY 6 HOURS PRN
Status: DISCONTINUED | OUTPATIENT
Start: 2022-09-13 | End: 2022-09-19 | Stop reason: HOSPADM

## 2022-09-13 RX ORDER — FENTANYL CITRATE 50 UG/ML
25 INJECTION, SOLUTION INTRAMUSCULAR; INTRAVENOUS
Status: DISCONTINUED | OUTPATIENT
Start: 2022-09-13 | End: 2022-09-19 | Stop reason: HOSPADM

## 2022-09-13 RX ORDER — FENTANYL CITRATE 50 UG/ML
50 INJECTION, SOLUTION INTRAMUSCULAR; INTRAVENOUS
Status: DISCONTINUED | OUTPATIENT
Start: 2022-09-13 | End: 2022-09-19 | Stop reason: HOSPADM

## 2022-09-13 RX ORDER — SODIUM CHLORIDE 9 MG/ML
INJECTION, SOLUTION INTRAVENOUS CONTINUOUS
Status: DISCONTINUED | OUTPATIENT
Start: 2022-09-13 | End: 2022-09-19 | Stop reason: HOSPADM

## 2022-09-13 RX ORDER — ONDANSETRON 2 MG/ML
4 INJECTION INTRAMUSCULAR; INTRAVENOUS EVERY 6 HOURS PRN
Status: DISCONTINUED | OUTPATIENT
Start: 2022-09-13 | End: 2022-09-19 | Stop reason: HOSPADM

## 2022-09-13 RX ADMIN — SODIUM CHLORIDE 40 MG: 9 INJECTION, SOLUTION INTRAMUSCULAR; INTRAVENOUS; SUBCUTANEOUS at 15:56

## 2022-09-13 RX ADMIN — SODIUM CHLORIDE: 9 INJECTION, SOLUTION INTRAVENOUS at 22:05

## 2022-09-14 ENCOUNTER — APPOINTMENT (OUTPATIENT)
Dept: GENERAL RADIOLOGY | Age: 87
DRG: 389 | End: 2022-09-14
Attending: SURGERY
Payer: MEDICARE

## 2022-09-14 LAB
ANION GAP SERPL CALCULATED.3IONS-SCNC: 10 MEQ/L (ref 8–16)
BUN BLDV-MCNC: 9 MG/DL (ref 7–22)
CALCIUM SERPL-MCNC: 8.5 MG/DL (ref 8.5–10.5)
CHLORIDE BLD-SCNC: 110 MEQ/L (ref 98–111)
CO2: 22 MEQ/L (ref 23–33)
CREAT SERPL-MCNC: 0.6 MG/DL (ref 0.4–1.2)
ERYTHROCYTE [DISTWIDTH] IN BLOOD BY AUTOMATED COUNT: 14 % (ref 11.5–14.5)
ERYTHROCYTE [DISTWIDTH] IN BLOOD BY AUTOMATED COUNT: 53.1 FL (ref 35–45)
GFR SERPL CREATININE-BSD FRML MDRD: > 90 ML/MIN/1.73M2
GLUCOSE BLD-MCNC: 89 MG/DL (ref 70–108)
HCT VFR BLD CALC: 31.6 % (ref 37–47)
HEMOGLOBIN: 9.9 GM/DL (ref 12–16)
MCH RBC QN AUTO: 32.2 PG (ref 26–33)
MCHC RBC AUTO-ENTMCNC: 31.3 GM/DL (ref 32.2–35.5)
MCV RBC AUTO: 102.9 FL (ref 81–99)
PLATELET # BLD: 208 THOU/MM3 (ref 130–400)
PMV BLD AUTO: 11.3 FL (ref 9.4–12.4)
POTASSIUM SERPL-SCNC: 3.3 MEQ/L (ref 3.5–5.2)
RBC # BLD: 3.07 MILL/MM3 (ref 4.2–5.4)
SODIUM BLD-SCNC: 142 MEQ/L (ref 135–145)
WBC # BLD: 6.9 THOU/MM3 (ref 4.8–10.8)

## 2022-09-14 PROCEDURE — C9113 INJ PANTOPRAZOLE SODIUM, VIA: HCPCS | Performed by: NURSE PRACTITIONER

## 2022-09-14 PROCEDURE — 2580000003 HC RX 258: Performed by: NURSE PRACTITIONER

## 2022-09-14 PROCEDURE — APPSS30 APP SPLIT SHARED TIME 16-30 MINUTES: Performed by: NURSE PRACTITIONER

## 2022-09-14 PROCEDURE — 74018 RADEX ABDOMEN 1 VIEW: CPT

## 2022-09-14 PROCEDURE — 6370000000 HC RX 637 (ALT 250 FOR IP): Performed by: NURSE PRACTITIONER

## 2022-09-14 PROCEDURE — 71045 X-RAY EXAM CHEST 1 VIEW: CPT

## 2022-09-14 PROCEDURE — 85027 COMPLETE CBC AUTOMATED: CPT

## 2022-09-14 PROCEDURE — 6360000002 HC RX W HCPCS: Performed by: NURSE PRACTITIONER

## 2022-09-14 PROCEDURE — 6360000004 HC RX CONTRAST MEDICATION: Performed by: NURSE PRACTITIONER

## 2022-09-14 PROCEDURE — 2500000003 HC RX 250 WO HCPCS: Performed by: NURSE PRACTITIONER

## 2022-09-14 PROCEDURE — 99232 SBSQ HOSP IP/OBS MODERATE 35: CPT | Performed by: SURGERY

## 2022-09-14 PROCEDURE — 80048 BASIC METABOLIC PNL TOTAL CA: CPT

## 2022-09-14 PROCEDURE — 1200000000 HC SEMI PRIVATE

## 2022-09-14 PROCEDURE — A4216 STERILE WATER/SALINE, 10 ML: HCPCS | Performed by: NURSE PRACTITIONER

## 2022-09-14 PROCEDURE — 36415 COLL VENOUS BLD VENIPUNCTURE: CPT

## 2022-09-14 RX ORDER — POTASSIUM CHLORIDE 7.45 MG/ML
10 INJECTION INTRAVENOUS PRN
Status: DISCONTINUED | OUTPATIENT
Start: 2022-09-14 | End: 2022-09-19 | Stop reason: HOSPADM

## 2022-09-14 RX ORDER — METOPROLOL TARTRATE 5 MG/5ML
5 INJECTION INTRAVENOUS EVERY 6 HOURS
Status: DISCONTINUED | OUTPATIENT
Start: 2022-09-14 | End: 2022-09-16

## 2022-09-14 RX ORDER — POTASSIUM CHLORIDE 20 MEQ/1
40 TABLET, EXTENDED RELEASE ORAL PRN
Status: DISCONTINUED | OUTPATIENT
Start: 2022-09-14 | End: 2022-09-19 | Stop reason: HOSPADM

## 2022-09-14 RX ADMIN — HYDRALAZINE HYDROCHLORIDE 10 MG: 20 INJECTION INTRAMUSCULAR; INTRAVENOUS at 19:37

## 2022-09-14 RX ADMIN — SODIUM CHLORIDE: 9 INJECTION, SOLUTION INTRAVENOUS at 06:30

## 2022-09-14 RX ADMIN — METOPROLOL TARTRATE 5 MG: 5 INJECTION, SOLUTION INTRAVENOUS at 12:18

## 2022-09-14 RX ADMIN — SODIUM CHLORIDE 40 MG: 9 INJECTION, SOLUTION INTRAMUSCULAR; INTRAVENOUS; SUBCUTANEOUS at 06:32

## 2022-09-14 RX ADMIN — HYDRALAZINE HYDROCHLORIDE 10 MG: 20 INJECTION INTRAMUSCULAR; INTRAVENOUS at 04:29

## 2022-09-14 RX ADMIN — DIATRIZOATE MEGLUMINE AND DIATRIZOATE SODIUM 90 ML: 660; 100 LIQUID ORAL; RECTAL at 12:25

## 2022-09-14 RX ADMIN — METOPROLOL TARTRATE 5 MG: 5 INJECTION, SOLUTION INTRAVENOUS at 16:30

## 2022-09-14 RX ADMIN — SODIUM CHLORIDE: 9 INJECTION, SOLUTION INTRAVENOUS at 15:38

## 2022-09-14 RX ADMIN — POTASSIUM BICARBONATE 40 MEQ: 782 TABLET, EFFERVESCENT ORAL at 12:35

## 2022-09-14 NOTE — CARE COORDINATION
9/14/22, 12:42 PM EDT  Discharge Planning Evaluation  Social work consult received, patient from [facility]. Patient/Family preference is to return to [facility] for Rehab, per patient and son Rickey Maldonado. The patient's current payor source at the facility is Medicaid   Medicare skilled days available: yes  Insurance precert:  no  Spoke with ELISABETH Butler at the facility.   Patient bed hold: yes  Anticipated transport plan: family  Do they require COVID 19 test to return to [facility]  Is there a required time frame which which COVID test needs done:24hrs  Patient's Healthcare Decision Maker: Legal Next of Kin

## 2022-09-14 NOTE — PLAN OF CARE
Problem: Discharge Planning  Goal: Discharge to home or other facility with appropriate resources  9/14/2022 1448 by Emile Haynes RN  Outcome: Progressing   Pt from ECF. Pt to return at discharge.       Problem: ABCDS Injury Assessment  Goal: Absence of physical injury  Outcome: Progressing  Flowsheets (Taken 9/14/2022 1446)  Absence of Physical Injury: Implement safety measures based on patient assessment     Problem: Safety - Adult  Goal: Free from fall injury  Outcome: Progressing  Flowsheets (Taken 9/14/2022 1446)  Free From Fall Injury: Instruct family/caregiver on patient safety     Problem: Pain  Goal: Verbalizes/displays adequate comfort level or baseline comfort level  Outcome: Progressing  Flowsheets (Taken 9/14/2022 0845)  Verbalizes/displays adequate comfort level or baseline comfort level:   Encourage patient to monitor pain and request assistance   Assess pain using appropriate pain scale   Administer analgesics based on type and severity of pain and evaluate response   Implement non-pharmacological measures as appropriate and evaluate response     Problem: Neurosensory - Adult  Goal: Achieves maximal functionality and self care  Outcome: Progressing  Flowsheets (Taken 9/14/2022 0845)  Achieves maximal functionality and self care: Encourage and assist patient to increase activity and self care with guidance from physical therapy/occupational therapy     Problem: Cardiovascular - Adult  Goal: Maintains optimal cardiac output and hemodynamic stability  Outcome: Progressing  Flowsheets (Taken 9/14/2022 0845)  Maintains optimal cardiac output and hemodynamic stability:   Monitor blood pressure and heart rate   Monitor urine output and notify Licensed Independent Practitioner for values outside of normal range   Assess for signs of decreased cardiac output     Problem: Skin/Tissue Integrity - Adult  Goal: Skin integrity remains intact  Outcome: Progressing  Flowsheets (Taken 9/14/2022 0845)  Skin Gastrointestinal - Adult  Goal: Maintains adequate nutritional intake  Outcome: Progressing  Flowsheets (Taken 9/14/2022 0845)  Maintains adequate nutritional intake:   Monitor percentage of each meal consumed   Monitor intake and output, weight and lab values     Problem: Genitourinary - Adult  Goal: Absence of urinary retention  Outcome: Progressing  Flowsheets (Taken 9/14/2022 0845)  Absence of urinary retention:   Assess patients ability to void and empty bladder   Monitor intake/output and perform bladder scan as needed    Care plan reviewed with patient. Patient verbalizes understanding of the plan of care and contributes to goal setting.

## 2022-09-14 NOTE — PROGRESS NOTES
Pt ha sneezing fit which resulted in NG tube coming out. This RN placed new one and pt tolerated well. Will verify placement.

## 2022-09-14 NOTE — CARE COORDINATION
9/14/22, 7:16 AM EDT  DISCHARGE PLANNING EVALUATION:    Jen Pain       Admitted: 9/13/2022/ [facility] day: 1   Location: 8A-11/011-A Reason for admit: SBO (small bowel obstruction) ([facility] [K56.609]  Small bowel obstruction ([facility] [V70.212]   PMH:  has a past medical history of Arthritis, Cancer ([facility] GERD (gastroesophageal reflux disease), Hyperlipidemia, Hypertension, and Scoliosis. Procedure: No.  Barriers to Discharge:  Admitted with possible SBO. NG. IVF at 120/hr. Afebrile. Will cont with conservative tx at this time. PCP: Ryan Fraga MD  Readmission Risk Score: 11.5%      Patient Goals/Plan/Treatment Preferences: SW is consulted on this pt so CM visit will be deferred today. CM will cont to follow for additional needs. Transportation/Food Security/Housekeeping Addressed:  No issues identified.

## 2022-09-14 NOTE — PROGRESS NOTES
General Surgery  Dr Kilo Mattson  Daily Progress Note      Patient:  Rodriguez Peralta      Unit/Bed:8A-11/011-A    YOB: 1933    MRN: [id number]     Acct: [de-identified]     Admit date: 9/13/2022    Subjective: patient resting in bed. [place]. Denies abdominal pain or N&V. No chest pain or SOB. No bowel function. NG tube LIWS zero output     All other ROS negative except noted in HPI      Patient Seen, Chart, Consults notes, Labs, Radiology studies reviewed. Past, Family, Social History unchanged from admission. Diet:  Diet NPO    Medications:  Scheduled Meds:   pantoprazole (PROTONIX) 40 mg injection  40 mg IntraVENous Daily     Continuous Infusions:   sodium chloride 120 mL/hr at 09/14/22 0630     PRN Meds:ondansetron, hydrALAZINE, fentanNYL **OR** fentanNYL    Objective:    CBC:   Recent Labs     09/14/22  0552   WBC 6.9   HGB 9.9*        BMP:    Recent Labs     09/14/22  0552      K 3.3*      CO2 22*   BUN 9   CREATININE 0.6   GLUCOSE 89     Calcium:  Recent Labs     09/14/22  0552   CALCIUM 8.5     Ionized Calcium:No results for input(s): IONCA in the last 72 hours. Magnesium:No results for input(s): MG in the last 72 hours. Phosphorus:No results for input(s): PHOS in the last 72 hours. BNP:No results for input(s): BNP in the last 72 hours. Glucose:No results for input(s): POCGLU in the last 72 hours. HgbA1C: No results for input(s): LABA1C in the last 72 hours. INR: No results for input(s): INR in the last 72 hours. Hepatic: No results for input(s): ALKPHOS, ALT, AST, PROT, BILITOT, BILIDIR, LABALBU in the last 72 hours. Amylase and Lipase:No results for input(s): LACTA, AMYLASE in the last 72 hours. Lactic Acid: No results for input(s): LACTA in the last 72 hours. Troponin: No results for input(s): CKTOTAL, CKMB, TROPONINT in the last 72 hours. BNP: No results for input(s): BNP in the last 72 hours.   Lipids: No results for input(s): CHOL, TRIG, HDL, LDL, LDLCALC in the last 72 hours. ABGs: No results found for: PH, PCO2, PO2, HCO3, O2SAT    Radiology reports as per the Radiologist  Radiology: XR ABDOMEN (KUB) (SINGLE AP VIEW)    Result Date: 9/14/2022  PROCEDURE: XR ABDOMEN (KUB) (SINGLE AP VIEW) CLINICAL INFORMATION: follow up SBO COMPARISON: None TECHNIQUE: AP supine abdomen performed. FINDINGS: Dilated loops of small bowel seen centrally with gas within the colon. No free air. The bones are demineralized. Degenerative changes of the thoracolumbar spine. Degenerative changes of both hips. Surgical hardware is seen in the left femur. Pedicle screws and roderick fixation seen in the lower lumbar spine. Prior cholecystectomy. 1. Dilated loops of small bowel seen centrally with gas within the colon. Findings can relate to partial small bowel obstruction or ileus. **This report has been created using voice recognition software. It may contain minor errors which are inherent in voice recognition technology. ** Final report electronically signed by Dr Kenneth Vázquez on 9/14/2022 8:18 AM       Physical Exam:  Vitals: BP (!) 183/77   Pulse 73   Temp 98.6 °F (37 °C) (Oral)   Resp 18   Ht 5' (1.524 m)   Wt 130 lb 14.4 oz (59.4 kg)   SpO2 95%   BMI 25.56 kg/m²   24 hour intake/output:  Intake/Output Summary (Last 24 hours) at 9/14/2022 1034  Last data filed at 9/14/2022 0938  Gross per 24 hour   Intake 750 ml   Output 1075 ml   Net -325 ml     Last 3 weights:   Wt Readings from Last 3 Encounters:   09/13/22 130 lb 14.4 oz (59.4 kg)   06/14/21 110 lb (49.9 kg)   04/05/21 109 lb (49.4 kg)       General appearance - oriented to person, place, and time  HEENT: Normocephalic and Atraumatic, [place]  Chest - clear to auscultation, no wheezes, rales or rhonchi, symmetric air entry  Cardiovascular - normal rate and regular rhythm  Abdomen - soft, nontender, slightly distended, no masses or organomegaly   Neurological - Alert and oriented and Normal speech  Integumentary - Skin color, texture, turgor normal. No Rashes or lesions  Musculoskeletal -Full ROM times 4 extremities, weakness       DVT prophylaxis: [] Lovenox                                 [x] SCDs                                 [] SQ Heparin                                 [] Encourage ambulation           [] Already on Anticoagulation                 Assessment:  Possible SBO persistent on KUB   HTN   Hypokalemia       Principal Problem:    SBO (small bowel obstruction) ([address]  Active Problems:    Small bowel obstruction (HCC)  Resolved Problems:    * No resolved hospital problems. *      Plan:  Conservative treatment  Bowel Rest  NG tube to LIWS  IV hydration  Analgesia and antiemetics as needed  Antibiotic therapy  Monitor Labs, lytes per protocol  Gastrografin challenge with KUB  today   Lopressor, apresoline  for BP control - if unsuccessful will consult Hospitalist       Total time spent in care of patient:  20 minutes collectively between subjective/objective examination, chart review, documentation, clinical reasoning and discussion with attending regarding plan/interval changes. Electronically signed by TERRY Gaines CNP on 9/14/2022 at 10:34 AM    Patient seen and examined independently by me 9/14/2022     I personally supervised the PA/NP in the evaluation, management and development of the treatment plan for Wanda Orta  on the same date of service as above. I personally interviewed Wanda Orta   and  discussed his review of symptoms as able due to the patient's condition, as well as performed an individual physical exam on the same   date of service as above. In addition I discussed the patient's condition and treatment options with the patient, if able, and/or designated family if available. I have also reviewed and agree with the past medical,  family and social history updates as well as care plans unless otherwise noted below. All questions were answered.       I examined independently and reviewed relevant data myself and may have done so in the context of team rounds. A full chart review was performed by me. I attest that this medical record entry accurately reflects signatures and notations that I made in my capacity as an M. D. when I treated and diagnosed Najma Quinn on the date of service above     I was responsible for all medical decision making involving this encounter. I identified and/or confirmed all problems associated with this patient encounter by my own direct physical examination of this patient and review of all radiology studies and labwork  that were ordered and available. Active Hospital Problems    Diagnosis     SBO (small bowel obstruction) ([facility] [K56.609]      Priority: Medium    Small bowel obstruction ([facility] [K56.609]      Priority: Medium        I  discussed the management of all of the identified problems with the APN or PA. I formulated the treatment plan for all identified problems and discussed those with the APN or PA . This management plan was then carried out and the patient's orders for care by the APN or PA. Total time personally spent on this patient encounter was 30 minutes which includes :  Preparing to see the patient( reviewing tests and chart)  Obtaining and reviewing separately obtained history  Performing a medically appropriate examination and evaluation  Ordering medications, tests, or procedures  Counseling and educating the patient/family/caregiver  Care coordination  Referring and communicating with other healthcare professionals  Documenting clinical information in the EHR  Independent interpretation of results and communicating the results to patient and care team  This includes a direct physical exam as well as all the other encounter activities described above. Time may be discontiguous. Time does not include procedures.     Please see our orders that were directed and approved by me if there are any new ones for the updated patient care plan. Above discussed and I agree with documentation and orders placed by Leonardo Rodriguez CNP    See any additional comments if needed below for any other updated orders and plans. Patient seen and examined independently by me. Above discussed and I agree with CNP. Labs, cultures, and radiographs where available were reviewed. See orders for the updated patient care plan. Christianne Hwang MD MD, appeared a little confused today unsure if she had had any flatus overnight. Abdomen is still mildly distended but bowel sounds are positive. NG is clamped currently as we are going to do a Gastrografin challenge. Flatplate this morning appears to show air in the colon which is a good sign. Still has no evidence of peritonitis.   Patient will have abdominal flatplate tonight and again in the morning hopefully this will give us an indication if surgery will be needed or not recently would like to try conservative treatment yet White blood cell count normal  9/14/2022   7:42 PM

## 2022-09-14 NOTE — H&P
[address] [place] [address]                              HISTORY AND PHYSICAL    PATIENT NAME: Royce Morrow                        :        1933  MED REC NO:   [id number]                           ROOM:       0011  ACCOUNT NO:   [de-identified]                           ADMIT DATE: 2022  PROVIDER:     Shakir Ignacio. Wiley Britt MD    CHIEF COMPLAINT:  Possible small-bowel obstruction. HISTORY OF PRESENT ILLNESS:  The patient is an 80-year-old white female  who is a resident at a nursing home in [facility] who has a history  of chronic diarrhea with apparent complete anal incontinence, who has  had episodes of persistent diarrhea, but also occasional nausea and  vomiting. Apparently, over the last 24 hours developed persistent  nausea and vomiting, diarrhea, along with abdominal distention and she  was sent from the nursing home at [place] [address].   There a CT scan was performed that was being read as a high-grade small  bowel obstruction, although no transition point was seen. The patient  apparently has seen Dr. Mariah Perez for cholecystectomy, a surgeon at [facility] in the past, but when he was called about this situation  because the patient was also found to have a hiatal hernia with possibly  some transverse colon involved, he felt the patient should be  transferred here. The patient has been transferred, we made a direct  admit, and is being seen on the [address] [facility] floor. She again has had  issues with this in the past.  It should be noted the son was at the  bedside. PAST MEDICAL HISTORY:  Positive for hypertension, cauda equina syndrome,  urinary and rectal incontinence. Denies diabetes or any known coronary  artery disease. PAST SURGICAL HISTORY:  Includes a laparoscopic cholecystectomy  approximately 10 years ago.   She has had a total abdominal hysterectomy  with bilateral salpingo-oophorectomy approximately 32 years ago. She  has had a knee replacement on the right and she has had a lumbar surgery  for cauda equina syndrome. MEDICATIONS:  Antihypertensive pill. SOCIAL HISTORY:  She is a nonsmoker, nondrinker. FAMILY HISTORY:  Noncontributable. REVIEW OF SYSTEMS:  10-point review of systems is otherwise negative. PHYSICAL EXAMINATION:  GENERAL:  The patient is an 80-year-old white female. She is resting in  bed, she has an NG tube in place. She has glasses on. HEENT:  The pupils are equal.  EOMs are intact. Sclerae are clear. NECK:  Soft. CARDIAC:  S1, S2.  LUNGS:  Respirations are equal bilaterally. ABDOMEN:  Mildly distended. Overall it is soft. Mild guarding, but  certainly no peritoneal irritation. Well-healed laparoscopic ports can  be seen in the mid and upper abdomen. She also has a lower midline  incision consistent with hysterectomy scar. Bowel sounds are present. The patient does have a Depends on. EXTREMITIES:  Upper extremities show good range of motion with good  radial pulses. Lower extremities show reasonable range of motion. She  has mild edema in bilateral ankles. LABORATORY DATA:  The patient apparently at [facility] had a normal  white blood cell count. DIAGNOSTIC DATA:  CT scan again was read as hiatal hernia with a loop of  transverse colon up in the hernia sac high-grade small bowel  obstruction, although no transition point. ASSESSMENT AND PLAN:  1. Possible small-bowel obstruction. Long discussion with the patient,  son and daughter via phone regarding the fact she has no evidence of  peritonitis that would suggest need for urgent surgery. I discussed  adhesions as usually the number one cause other small bowel obstruction. Plan is to continue NG decompression overnight and then do a  Gastrografin challenge tomorrow.   2.  Chronic diarrhea, difficult to know if it is due to the cauda equina  syndrome or just complete rectal tone and sphincter incontinence. 3.  Hiatal hernia. Further discussing with the patient's son and  daughter who seems to be very knowledgeable about their mother's past  medical history, they were not aware if she had a hiatal hernia. I  believe this is an incidental finding and has nothing to do with the  current process. It certainly could be related to some of the chronic  nausea that she has had. Obviously, we would like to try to avoid  surgery in the patient, but we did discuss potential if she has true  bowel obstruction, would require surgical intervention. We will  continue NG decompression overnight, recheck abdominal films in the  morning and then again plan a Gastrografin challenge. DANIA MCDOWELL [facility], MD    D: 09/13/2022 21:51:06       T: 09/13/2022 21:56:12     [id number]  Job#: [id number]     Doc#: [id number]

## 2022-09-14 NOTE — DISCHARGE INSTR - COC
Continuity of Care Form    Patient Name: Amberly Gilbert   :  1933  MRN:  [id number]    Admit date:  2022  Discharge date:  22    Code Status Order: No Order   Advance Directives:     Admitting Physician:   Royal Shahid MD  PCP: Tg Arizmendi MD    Discharging Nurse: [facility] Unit/Room#: 8A-11/011-A  Discharging Unit Phone Number: [phone]    Emergency Contact:   Extended Emergency Contact Information  Primary Emergency Contact: Nadine Alexander  Home Phone: [phone]  Relation: Child    Past Surgical History:  Past Surgical History:   Procedure Laterality Date    CHOLECYSTECTOMY      FACIAL SURGERY N/A 2021    REVISION OF WOUND AND READVANCEMENT OF FLAP OF SCALP performed by Lyndsay Chao MD at [facility] [address] Right     hip     FRACTURE SURGERY Left     femur    HERNIA REPAIR      JOINT REPLACEMENT Right     knee    SCALP SURGERY N/A 2021    SCALP REPAIR FLAP performed by Lyndsay Chao MD at [address] [place] [address]       Immunization History:   Immunization History   Administered Date(s) Administered    COVID-19, PFIZER GRAY top, DO NOT Dilute, (age 15 y+), IM, 30 mcg/0.3 mL 2022    COVID-19, PFIZER PURPLE top, DILUTE for use, (age 15 y+), 30mcg/0.3mL 2020, 2021, 2022       Active Problems:  Patient Active Problem List   Diagnosis Code    SBO (small bowel obstruction) ([facility] K56.609    Small bowel obstruction ([facility] K56.609       Isolation/Infection:   Isolation            No Isolation          Patient Infection Status       None to display            Nurse Assessment:  Last Vital Signs: BP (!) 189/82   Pulse 80   Temp 98.4 °F (36.9 °C) (Oral)   Resp 16   Ht 5' (1.524 m)   Wt 130 lb 14.4 oz (59.4 kg)   SpO2 92%   BMI 25.56 kg/m²     Last documented pain score (0-10 scale):    Last Weight:   Wt Readings from Last 1 Encounters:   22 130 lb 14.4 oz (59.4 kg)     Mental Status: oriented    IV Access:  - None    Nursing Mobility/ADLs:  Walking   Assisted  Transfer  Assisted  Bathing  Assisted  Dressing  Assisted  Toileting  Assisted  Feeding  Independent  Med Admin  Independent  Med Delivery   whole    Wound Care Documentation and Therapy:  Incision 04/05/21  (Active)   Number of days: 527        Elimination:  Continence: Bowel: Yes  Bladder: Yes  Urinary Catheter: None   Colostomy/Ileostomy/Ileal Conduit: No       Date of Last BM: 9/18/22    Intake/Output Summary (Last 24 hours) at 9/14/2022 1247  Last data filed at 9/14/2022 0938  Gross per 24 hour   Intake 750 ml   Output 1075 ml   Net -325 ml     I/O last 3 completed shifts: In: 750 [I.V.:740; IV Piggyback:10]  Out: 750 [Urine:750]    Safety Concerns: At Risk for Falls    Impairments/Disabilities:      None    Nutrition Therapy:  Current Nutrition Therapy:   Oral diet- Soft and bite sized    Routes of Feeding: Oral  Liquids: No Restrictions  Daily Fluid Restriction: no  Last Modified Barium Swallow with Video (Video Swallowing Test): not done    Treatments at the Time of Hospital Discharge:   Respiratory Treatments:   Oxygen Therapy:  is not on home oxygen therapy.   Ventilator:    - No ventilator support    Rehab Therapies: Physical Therapy/ Occupational Therapy  Weight Bearing Status/Restrictions: No weight bearing restrictions  Other Medical Equipment (for information only, NOT a DME order):  walker  Other Treatments:     Patient's personal belongings (please select all that are sent with patient):  None    RN SIGNATURE:  Electronically signed by Kimberly Anthony RN on 9/19/22 at 11:28 AM EDT    CASE MANAGEMENT/SOCIAL WORK SECTION    Inpatient Status Date: 9/13/2022    Readmission Risk Assessment Score:  Readmission Risk              Risk of Unplanned Readmission:  7           Discharging to Facility/ Agency   Name: [address]  Phone: [phone]  Fax: [phone]    Dialysis Facility (if applicable)   Name:  Address:  Dialysis Schedule:  Phone:  Fax:    / signature: Electronically signed by BRET Izaguirre on 9/14/22 at 12:49 PM EDT    PHYSICIAN SECTION    Prognosis: {Prognosis:8854571181}    Condition at Discharge: Dirk Portillo Patient Condition:[id number]}    Rehab Potential (if transferring to Rehab): {Prognosis:2078023008}    Recommended Labs or Other Treatments After Discharge: ***    Physician Certification: I certify the above information and transfer of Najma Quinn  is necessary for the continuing treatment of the diagnosis listed and that she requires {Admit to Appropriate Level of Care:68702} for {GREATER/LESS:740352545} 30 days.      Update Admission H&P: {CHP DME Changes in ELSMM:171969414}    PHYSICIAN SIGNATURE:  {Esignature:[id number]}

## 2022-09-15 ENCOUNTER — APPOINTMENT (OUTPATIENT)
Dept: GENERAL RADIOLOGY | Age: 87
DRG: 389 | End: 2022-09-15
Attending: SURGERY
Payer: MEDICARE

## 2022-09-15 PROCEDURE — 99232 SBSQ HOSP IP/OBS MODERATE 35: CPT | Performed by: SURGERY

## 2022-09-15 PROCEDURE — 6370000000 HC RX 637 (ALT 250 FOR IP): Performed by: NURSE PRACTITIONER

## 2022-09-15 PROCEDURE — A4216 STERILE WATER/SALINE, 10 ML: HCPCS | Performed by: NURSE PRACTITIONER

## 2022-09-15 PROCEDURE — APPSS30 APP SPLIT SHARED TIME 16-30 MINUTES: Performed by: NURSE PRACTITIONER

## 2022-09-15 PROCEDURE — 6360000002 HC RX W HCPCS: Performed by: NURSE PRACTITIONER

## 2022-09-15 PROCEDURE — C9113 INJ PANTOPRAZOLE SODIUM, VIA: HCPCS | Performed by: NURSE PRACTITIONER

## 2022-09-15 PROCEDURE — 2580000003 HC RX 258: Performed by: NURSE PRACTITIONER

## 2022-09-15 PROCEDURE — 1200000000 HC SEMI PRIVATE

## 2022-09-15 PROCEDURE — 2500000003 HC RX 250 WO HCPCS: Performed by: NURSE PRACTITIONER

## 2022-09-15 PROCEDURE — 74018 RADEX ABDOMEN 1 VIEW: CPT

## 2022-09-15 RX ORDER — GENTAMICIN SULFATE 3 MG/ML
1 SOLUTION/ DROPS OPHTHALMIC 3 TIMES DAILY
Status: DISCONTINUED | OUTPATIENT
Start: 2022-09-15 | End: 2022-09-19 | Stop reason: HOSPADM

## 2022-09-15 RX ADMIN — METOPROLOL TARTRATE 5 MG: 5 INJECTION, SOLUTION INTRAVENOUS at 16:41

## 2022-09-15 RX ADMIN — METOPROLOL TARTRATE 5 MG: 5 INJECTION, SOLUTION INTRAVENOUS at 00:52

## 2022-09-15 RX ADMIN — GENTAMICIN SULFATE 1 DROP: 3 SOLUTION OPHTHALMIC at 21:22

## 2022-09-15 RX ADMIN — SODIUM CHLORIDE: 9 INJECTION, SOLUTION INTRAVENOUS at 00:50

## 2022-09-15 RX ADMIN — HYDRALAZINE HYDROCHLORIDE 10 MG: 20 INJECTION INTRAMUSCULAR; INTRAVENOUS at 12:21

## 2022-09-15 RX ADMIN — SODIUM CHLORIDE: 9 INJECTION, SOLUTION INTRAVENOUS at 09:57

## 2022-09-15 RX ADMIN — HYDRALAZINE HYDROCHLORIDE 10 MG: 20 INJECTION INTRAMUSCULAR; INTRAVENOUS at 21:42

## 2022-09-15 RX ADMIN — GENTAMICIN SULFATE 1 DROP: 3 SOLUTION OPHTHALMIC at 09:53

## 2022-09-15 RX ADMIN — SODIUM CHLORIDE 40 MG: 9 INJECTION, SOLUTION INTRAMUSCULAR; INTRAVENOUS; SUBCUTANEOUS at 09:53

## 2022-09-15 RX ADMIN — METOPROLOL TARTRATE 5 MG: 5 INJECTION, SOLUTION INTRAVENOUS at 06:15

## 2022-09-15 RX ADMIN — METOPROLOL TARTRATE 5 MG: 5 INJECTION, SOLUTION INTRAVENOUS at 23:17

## 2022-09-15 RX ADMIN — GENTAMICIN SULFATE 1 DROP: 3 SOLUTION OPHTHALMIC at 13:39

## 2022-09-15 RX ADMIN — METOPROLOL TARTRATE 5 MG: 5 INJECTION, SOLUTION INTRAVENOUS at 10:01

## 2022-09-15 NOTE — PROGRESS NOTES
Nurse  informed by pt son, Bonny Godoy, that pt sneezed and accidentally pulled ngt out. New 16french [place] sump inserted without difficulty right nares. NGT not marked with CM, taped in placed at third dot from end. Verified by air bolus. Taped to nose. xray for ngt placement ordered.

## 2022-09-15 NOTE — PROGRESS NOTES
Nurse spoke with Asmita Alvarez NP while on unit. NGT was to be clamped all night. Reviewed Xray for ngt placement with BRANDEN Edwards which shows gastrogaffin has reached rectal-sigmoid colon. Pt also has had a large loose watery brown stool. BRANDEN Edwards discontinued NGT suction. Nurse also discussed concern for yellowish-light green bilat eye exudate. BRANDEN Edwards will order something for them.

## 2022-09-15 NOTE — PLAN OF CARE
Problem: Nutrition Deficit:  Goal: Optimize nutritional status  Outcome: Not Progressing  Note: Remains NPO with NGT to LIWS     Problem: Gastrointestinal - Adult  Goal: Maintains adequate nutritional intake  Outcome: Not Progressing  Note: Remains NPO. Has NGT to [facility]WS     Problem: Gastrointestinal - Adult  Goal: Maintains adequate nutritional intake  Outcome: Not Progressing  Note: Remains NPO. Has NGT to LIWS     Problem: Discharge Planning  Goal: Discharge to home or other facility with appropriate resources  Outcome: Progressing     Problem: Skin/Tissue Integrity  Goal: Absence of new skin breakdown  Description: 1. Monitor for areas of redness and/or skin breakdown  2. Assess vascular access sites hourly  3. Every 4-6 hours minimum:  Change oxygen saturation probe site  4. Every 4-6 hours:  If on nasal continuous positive airway pressure, respiratory therapy assess nares and determine need for appliance change or resting period.   Outcome: Progressing     Problem: ABCDS Injury Assessment  Goal: Absence of physical injury  Outcome: Progressing     Problem: Safety - Adult  Goal: Free from fall injury  Outcome: Progressing  Flowsheets (Taken 9/14/2022 2130)  Free From Fall Injury: Instruct family/caregiver on patient safety     Problem: Pain  Goal: Verbalizes/displays adequate comfort level or baseline comfort level  Outcome: Progressing  Flowsheets (Taken 9/14/2022 2130)  Verbalizes/displays adequate comfort level or baseline comfort level: Encourage patient to monitor pain and request assistance     Problem: Neurosensory - Adult  Goal: Achieves maximal functionality and self care  Outcome: Progressing  Flowsheets (Taken 9/14/2022 2130)  Achieves maximal functionality and self care: Encourage visually impaired, hearing impaired and aphasic patients to use assistive/communication devices     Problem: Cardiovascular - Adult  Goal: Maintains optimal cardiac output and hemodynamic stability  Outcome: Progressing     Problem: Skin/Tissue Integrity - Adult  Goal: Skin integrity remains intact  Outcome: Progressing  Flowsheets (Taken 9/14/2022 2130)  Skin Integrity Remains Intact: Monitor for areas of redness and/or skin breakdown  Goal: Oral mucous membranes remain intact  Outcome: Progressing  Flowsheets (Taken 9/14/2022 2130)  Oral Mucous Membranes Remain Intact: Assess oral mucosa and hygiene practices     Problem: Musculoskeletal - Adult  Goal: Return mobility to safest level of function  Outcome: Progressing  Goal: Return ADL status to a safe level of function  Outcome: Progressing     Problem: Gastrointestinal - Adult  Goal: Minimal or absence of nausea and vomiting  Outcome: Progressing  Goal: Maintains or returns to baseline bowel function  Outcome: Progressing     Problem: Genitourinary - Adult  Goal: Absence of urinary retention  Outcome: Progressing  Flowsheets (Taken 9/14/2022 0845 by Bard Maurilio RN)  Absence of urinary retention:   Assess patients ability to void and empty bladder   Monitor intake/output and perform bladder scan as needed  Note: Pt is incontinent. Has purewick in place. Problem: Skin/Tissue Integrity  Goal: Absence of new skin breakdown  Description: 1. Monitor for areas of redness and/or skin breakdown  2. Assess vascular access sites hourly  3. Every 4-6 hours minimum:  Change oxygen saturation probe site  4. Every 4-6 hours:  If on nasal continuous positive airway pressure, respiratory therapy assess nares and determine need for appliance change or resting period.   Outcome: Progressing     Problem: ABCDS Injury Assessment  Goal: Absence of physical injury  Outcome: Progressing     Problem: Safety - Adult  Goal: Free from fall injury  Outcome: Progressing  Flowsheets (Taken 9/14/2022 2130)  Free From Fall Injury: Instruct family/caregiver on patient safety     Problem: Pain  Goal: Verbalizes/displays adequate comfort level or baseline comfort level  Outcome: Progressing  Flowsheets (Taken 9/14/2022 2130)  Verbalizes/displays adequate comfort level or baseline comfort level: Encourage patient to monitor pain and request assistance     Problem: Neurosensory - Adult  Goal: Achieves maximal functionality and self care  Outcome: Progressing  Flowsheets (Taken 9/14/2022 2130)  Achieves maximal functionality and self care: Encourage visually impaired, hearing impaired and aphasic patients to use assistive/communication devices     Problem: Cardiovascular - Adult  Goal: Maintains optimal cardiac output and hemodynamic stability  Outcome: Progressing     Problem: Skin/Tissue Integrity - Adult  Goal: Skin integrity remains intact  Outcome: Progressing  Flowsheets (Taken 9/14/2022 2130)  Skin Integrity Remains Intact: Monitor for areas of redness and/or skin breakdown     Problem: Skin/Tissue Integrity - Adult  Goal: Oral mucous membranes remain intact  Outcome: Progressing  Flowsheets (Taken 9/14/2022 2130)  Oral Mucous Membranes Remain Intact: Assess oral mucosa and hygiene practices     Problem: Musculoskeletal - Adult  Goal: Return mobility to safest level of function  Outcome: Progressing     Problem: Musculoskeletal - Adult  Goal: Return ADL status to a safe level of function  Outcome: Progressing     Problem: Gastrointestinal - Adult  Goal: Minimal or absence of nausea and vomiting  Outcome: Progressing     Problem: Gastrointestinal - Adult  Goal: Maintains or returns to baseline bowel function  Outcome: Progressing     Problem: Genitourinary - Adult  Goal: Absence of urinary retention  Outcome: Progressing  Flowsheets (Taken 9/14/2022 0845 by Robles Johnson RN)  Absence of urinary retention:   Assess patients ability to void and empty bladder   Monitor intake/output and perform bladder scan as needed  Note: Pt is incontinent. Has purewick in place.      Problem: Nutrition Deficit:  Goal: Optimize nutritional status  Outcome: Not Progressing  Note: Remains NPO with NGT to [facility]     Problem: Gastrointestinal - Adult  Goal: Maintains adequate nutritional intake  Outcome: Not Progressing  Note: Remains NPO.  Has NGT to [facility]

## 2022-09-15 NOTE — PROGRESS NOTES
General Surgery  Dr Margaret Barr  Daily Progress Note      Patient:  Nyasia Hoyos      Unit/Bed:8A-11/011-A    YOB: 1933    MRN: [id number]     Acct: [de-identified]     Admit date: 9/13/2022    Subjective: patient resting in bed. [place]. Denies abdominal pain or N&V. No chest pain or SOB. NG tube to LIWS despite needing to be clamped overnight for repeat KUB. Patient did have large brown watery stool this am. KUB reviewed and discussed. Son at bedside, all questions answered. All other ROS negative except noted in HPI      Patient Seen, Chart, Consults notes, Labs, Radiology studies reviewed. Past, Family, Social History unchanged from admission. Diet:  ADULT DIET; Clear Liquid    Medications:  Scheduled Meds:   gentamicin  1 drop Both Eyes TID    metoprolol  5 mg IntraVENous Q6H    pantoprazole (PROTONIX) 40 mg injection  40 mg IntraVENous Daily     Continuous Infusions:   sodium chloride 120 mL/hr at 09/15/22 0957     PRN Meds:potassium chloride **OR** potassium alternative oral replacement **OR** potassium chloride, ondansetron, hydrALAZINE, fentanNYL **OR** fentanNYL    Objective:    CBC:   Recent Labs     09/14/22  0552   WBC 6.9   HGB 9.9*          BMP:    Recent Labs     09/14/22  0552      K 3.3*      CO2 22*   BUN 9   CREATININE 0.6   GLUCOSE 89       Calcium:  Recent Labs     09/14/22  0552   CALCIUM 8.5       Ionized Calcium:No results for input(s): IONCA in the last 72 hours. Magnesium:No results for input(s): MG in the last 72 hours. Phosphorus:No results for input(s): PHOS in the last 72 hours. BNP:No results for input(s): BNP in the last 72 hours. Glucose:No results for input(s): POCGLU in the last 72 hours. HgbA1C: No results for input(s): LABA1C in the last 72 hours. INR: No results for input(s): INR in the last 72 hours. Hepatic: No results for input(s): ALKPHOS, ALT, AST, PROT, BILITOT, BILIDIR, LABALBU in the last 72 hours. Amylase and Lipase: No results for input(s): LACTA, AMYLASE in the last 72 hours. Lactic Acid: No results for input(s): LACTA in the last 72 hours. Troponin: No results for input(s): CKTOTAL, CKMB, TROPONINT in the last 72 hours. BNP: No results for input(s): BNP in the last 72 hours. Lipids: No results for input(s): CHOL, TRIG, HDL, LDL, LDLCALC in the last 72 hours. ABGs: No results found for: PH, PCO2, PO2, HCO3, O2SAT    Radiology reports as per the Radiologist  Radiology: XR ABDOMEN (KUB) (SINGLE AP VIEW)    Result Date: 9/14/2022  PROCEDURE: XR ABDOMEN (KUB) (SINGLE AP VIEW) CLINICAL INFORMATION: follow up SBO COMPARISON: None TECHNIQUE: AP supine abdomen performed. FINDINGS: Dilated loops of small bowel seen centrally with gas within the colon. No free air. The bones are demineralized. Degenerative changes of the thoracolumbar spine. Degenerative changes of both hips. Surgical hardware is seen in the left femur. Pedicle screws and roderick fixation seen in the lower lumbar spine. Prior cholecystectomy. 1. Dilated loops of small bowel seen centrally with gas within the colon. Findings can relate to partial small bowel obstruction or ileus. **This report has been created using voice recognition software. It may contain minor errors which are inherent in voice recognition technology. ** Final report electronically signed by Dr Reyes Panning on 9/14/2022 8:18 AM       Physical Exam:  Vitals: BP (!) 168/78   Pulse 79   Temp 98.2 °F (36.8 °C) (Oral)   Resp 18   Ht 5' (1.524 m)   Wt 130 lb 14.4 oz (59.4 kg)   SpO2 96%   BMI 25.56 kg/m²   24 hour intake/output:  Intake/Output Summary (Last 24 hours) at 9/15/2022 1038  Last data filed at 9/15/2022 0600  Gross per 24 hour   Intake --   Output 1125 ml   Net -1125 ml       Last 3 weights:   Wt Readings from Last 3 Encounters:   09/13/22 130 lb 14.4 oz (59.4 kg)   06/14/21 110 lb (49.9 kg)   04/05/21 109 lb (49.4 kg)       General appearance - oriented to person, place, and time  HEENT: Normocephalic and Atraumatic, [place]  Chest - clear to auscultation, no wheezes, rales or rhonchi, symmetric air entry  Cardiovascular - normal rate and regular rhythm  Abdomen - soft, nontender, slightly distended, no masses or organomegaly   Neurological - Alert and oriented and Normal speech  Integumentary - Skin color, texture, turgor normal. No Rashes or lesions  Musculoskeletal -Full ROM times 4 extremities, weakness       DVT prophylaxis: [] Lovenox                                 [x] SCDs                                 [] SQ Heparin                                 [] Encourage ambulation           [] Already on Anticoagulation                 Assessment:  SBO resolved  HTN - improving   Hypokalemia   Periorbital cellulitis - drainage       Principal Problem:    SBO (small bowel obstruction) (HCC)  Active Problems:    Small bowel obstruction (HCC)  Resolved Problems:    * No resolved hospital problems. *      Plan:  Conservative treatment  Clear liquids at lunch   NG tube removed  IV hydration continued   Analgesia and antiemetics as needed  Antibiotic eye drops x 7 days   Monitor Labs, lytes per protocol  GI prophylaxis and SCD's   Gastrografin challenge partially completed   Lotigre cazaresesoline  for BP control change to oral medications   Discharge planning Sunday/Monday pending progress       Total time spent in care of patient:  20 minutes collectively between subjective/objective examination, chart review, documentation, clinical reasoning and discussion with attending regarding plan/interval changes. Electronically signed by TERRY Stein CNP on 9/15/2022 at 10:38 AM    Patient seen and examined independently by me 9/15/2022     I personally supervised the PA/NP in the evaluation, management and development of the treatment plan for Soha Osborn  on the same date of service as above.       I personally interviewed Soha Osborn   and  discussed his review of symptoms as able due to the patient's condition, as well as performed an individual physical exam on the same   date of service as above. In addition I discussed the patient's condition and treatment options with the patient, if able, and/or designated family if available. I have also reviewed and agree with the past medical,  family and social history updates as well as care plans unless otherwise noted below. All questions were answered. I examined independently and reviewed relevant data myself and may have done so in the context of team rounds. A full chart review was performed by me. I attest that this medical record entry accurately reflects signatures and notations that I made in my capacity as an M. D. when I treated and diagnosed Lillian Montero on the date of service above     I was responsible for all medical decision making involving this encounter. I identified and/or confirmed all problems associated with this patient encounter by my own direct physical examination of this patient and review of all radiology studies and labwork  that were ordered and available. Active Hospital Problems    Diagnosis     SBO (small bowel obstruction) ([facility] [address] [K56.609]      Priority: Medium    Small bowel obstruction ([address] [K56.609]      Priority: Medium        I  discussed the management of all of the identified problems with the APN or PA. I formulated the treatment plan for all identified problems and discussed those with the APN or PA . This management plan was then carried out and the patient's orders for care by the APN or PA.       Total time personally spent on this patient encounter was 25minutes which includes :  Preparing to see the patient( reviewing tests and chart)  Obtaining and reviewing separately obtained history  Performing a medically appropriate examination and evaluation  Ordering medications, tests, or procedures  Counseling and educating the patient/family/caregiver  Care coordination  Referring and communicating with other healthcare professionals  Documenting clinical information in the EHR  Independent interpretation of results and communicating the results to patient and care team  This includes a direct physical exam as well as all the other encounter activities described above. Time may be discontiguous. Time does not include procedures. Please see our orders that were directed and approved by me if there are any new ones for the updated patient care plan. Above discussed and I agree with documentation and orders placed by Anahi Mejia CNP    See any additional comments if needed below for any other updated orders and plans. Patient seen and examined independently by me. Above discussed and I agree with CNP. Labs, cultures, and radiographs where available were reviewed. See orders for the updated patient care plan. Jigar Vazquez MD MD, patient had a large bowel movement last night and on Gastrografin challenge had dye get through to the rectosigmoid. Abdominal film still shows some dilated loops of small bowel but clinically her abdomen is much softer. Reviewed the abdominal films. No evidence of certainly complete small bowel obstruction.   We will begin a diet of clear liquids and monitor  9/15/2022   11:59 AM

## 2022-09-15 NOTE — CARE COORDINATION
9/15/22, 1:27 PM EDT    DISCHARGE PLANNING EVALUATION    Left another message for admissions at [facility] regarding patients status at [facility] and covid test needed at discharge. 3:10 PM   Spoke with Chintan BARBER, patient is Medicaid bed hold and they will accept back at discharge.

## 2022-09-15 NOTE — CARE COORDINATION
9/15/22, 11:11 AM EDT    DISCHARGE ON GOING EVALUATION    [facility] day: 2  Location: 8A-11/011-A Reason for admit: SBO (small bowel obstruction) ([facility] [address] [K56.609]  Small bowel obstruction ([facility] [address] [A60.706]   Procedure: No.  Barriers to Discharge: NG now clamped. Trial clear liquids. Trend KUB. IVF. PCP: Sherly Mary MD  Readmission Risk Score: 13.5%  Patient Goals/Plan/Treatment Preferences: From [employer] and plans return at discharge. SW following.

## 2022-09-16 ENCOUNTER — APPOINTMENT (OUTPATIENT)
Dept: INTERVENTIONAL RADIOLOGY/VASCULAR | Age: 87
DRG: 389 | End: 2022-09-16
Attending: SURGERY
Payer: MEDICARE

## 2022-09-16 PROBLEM — I10 UNCONTROLLED HYPERTENSION: Status: ACTIVE | Noted: 2022-09-16

## 2022-09-16 LAB
ANION GAP SERPL CALCULATED.3IONS-SCNC: 12 MEQ/L (ref 8–16)
BUN BLDV-MCNC: 13 MG/DL (ref 7–22)
CALCIUM SERPL-MCNC: 8.1 MG/DL (ref 8.5–10.5)
CHLORIDE BLD-SCNC: 113 MEQ/L (ref 98–111)
CO2: 19 MEQ/L (ref 23–33)
CREAT SERPL-MCNC: 0.5 MG/DL (ref 0.4–1.2)
ERYTHROCYTE [DISTWIDTH] IN BLOOD BY AUTOMATED COUNT: 14.3 % (ref 11.5–14.5)
ERYTHROCYTE [DISTWIDTH] IN BLOOD BY AUTOMATED COUNT: 54.2 FL (ref 35–45)
GFR SERPL CREATININE-BSD FRML MDRD: > 90 ML/MIN/1.73M2
GLUCOSE BLD-MCNC: 84 MG/DL (ref 70–108)
HCT VFR BLD CALC: 31.1 % (ref 37–47)
HEMOGLOBIN: 9.8 GM/DL (ref 12–16)
MAGNESIUM: 1.4 MG/DL (ref 1.6–2.4)
MCH RBC QN AUTO: 32.5 PG (ref 26–33)
MCHC RBC AUTO-ENTMCNC: 31.5 GM/DL (ref 32.2–35.5)
MCV RBC AUTO: 103 FL (ref 81–99)
PLATELET # BLD: 213 THOU/MM3 (ref 130–400)
PMV BLD AUTO: 11.8 FL (ref 9.4–12.4)
POTASSIUM SERPL-SCNC: 3.1 MEQ/L (ref 3.5–5.2)
RBC # BLD: 3.02 MILL/MM3 (ref 4.2–5.4)
SODIUM BLD-SCNC: 144 MEQ/L (ref 135–145)
WBC # BLD: 7.2 THOU/MM3 (ref 4.8–10.8)

## 2022-09-16 PROCEDURE — 6360000002 HC RX W HCPCS: Performed by: NURSE PRACTITIONER

## 2022-09-16 PROCEDURE — 6370000000 HC RX 637 (ALT 250 FOR IP): Performed by: NURSE PRACTITIONER

## 2022-09-16 PROCEDURE — 99232 SBSQ HOSP IP/OBS MODERATE 35: CPT | Performed by: SURGERY

## 2022-09-16 PROCEDURE — 93970 EXTREMITY STUDY: CPT

## 2022-09-16 PROCEDURE — 99231 SBSQ HOSP IP/OBS SF/LOW 25: CPT

## 2022-09-16 PROCEDURE — 2580000003 HC RX 258: Performed by: PHYSICIAN ASSISTANT

## 2022-09-16 PROCEDURE — 6370000000 HC RX 637 (ALT 250 FOR IP): Performed by: PHYSICIAN ASSISTANT

## 2022-09-16 PROCEDURE — 36415 COLL VENOUS BLD VENIPUNCTURE: CPT

## 2022-09-16 PROCEDURE — 85027 COMPLETE CBC AUTOMATED: CPT

## 2022-09-16 PROCEDURE — C9113 INJ PANTOPRAZOLE SODIUM, VIA: HCPCS | Performed by: NURSE PRACTITIONER

## 2022-09-16 PROCEDURE — 92610 EVALUATE SWALLOWING FUNCTION: CPT

## 2022-09-16 PROCEDURE — 6360000002 HC RX W HCPCS: Performed by: PHYSICIAN ASSISTANT

## 2022-09-16 PROCEDURE — A4216 STERILE WATER/SALINE, 10 ML: HCPCS | Performed by: NURSE PRACTITIONER

## 2022-09-16 PROCEDURE — 2580000003 HC RX 258: Performed by: NURSE PRACTITIONER

## 2022-09-16 PROCEDURE — 6370000000 HC RX 637 (ALT 250 FOR IP)

## 2022-09-16 PROCEDURE — 93005 ELECTROCARDIOGRAM TRACING: CPT

## 2022-09-16 PROCEDURE — 1200000000 HC SEMI PRIVATE

## 2022-09-16 PROCEDURE — 2500000003 HC RX 250 WO HCPCS: Performed by: NURSE PRACTITIONER

## 2022-09-16 PROCEDURE — 83735 ASSAY OF MAGNESIUM: CPT

## 2022-09-16 PROCEDURE — 80048 BASIC METABOLIC PNL TOTAL CA: CPT

## 2022-09-16 RX ORDER — LISINOPRIL 20 MG/1
20 TABLET ORAL DAILY
Status: DISCONTINUED | OUTPATIENT
Start: 2022-09-16 | End: 2022-09-17

## 2022-09-16 RX ORDER — MAGNESIUM SULFATE IN WATER 40 MG/ML
2000 INJECTION, SOLUTION INTRAVENOUS PRN
Status: DISCONTINUED | OUTPATIENT
Start: 2022-09-16 | End: 2022-09-19 | Stop reason: HOSPADM

## 2022-09-16 RX ORDER — AMLODIPINE BESYLATE 5 MG/1
5 TABLET ORAL DAILY
Status: DISCONTINUED | OUTPATIENT
Start: 2022-09-16 | End: 2022-09-16

## 2022-09-16 RX ORDER — ENOXAPARIN SODIUM 100 MG/ML
40 INJECTION SUBCUTANEOUS DAILY
Status: DISCONTINUED | OUTPATIENT
Start: 2022-09-16 | End: 2022-09-19 | Stop reason: HOSPADM

## 2022-09-16 RX ORDER — MAGNESIUM SULFATE 1 G/100ML
1000 INJECTION INTRAVENOUS PRN
Status: DISCONTINUED | OUTPATIENT
Start: 2022-09-16 | End: 2022-09-16 | Stop reason: SDUPTHER

## 2022-09-16 RX ORDER — AMLODIPINE BESYLATE 10 MG/1
10 TABLET ORAL DAILY
Status: DISCONTINUED | OUTPATIENT
Start: 2022-09-17 | End: 2022-09-19 | Stop reason: HOSPADM

## 2022-09-16 RX ADMIN — POTASSIUM CHLORIDE 40 MEQ: 1500 TABLET, EXTENDED RELEASE ORAL at 09:56

## 2022-09-16 RX ADMIN — GENTAMICIN SULFATE 1 DROP: 3 SOLUTION OPHTHALMIC at 20:13

## 2022-09-16 RX ADMIN — SODIUM CHLORIDE: 9 INJECTION, SOLUTION INTRAVENOUS at 02:39

## 2022-09-16 RX ADMIN — AMLODIPINE BESYLATE 5 MG: 5 TABLET ORAL at 14:16

## 2022-09-16 RX ADMIN — HYDRALAZINE HYDROCHLORIDE 10 MG: 20 INJECTION INTRAMUSCULAR; INTRAVENOUS at 16:49

## 2022-09-16 RX ADMIN — SODIUM CHLORIDE: 9 INJECTION, SOLUTION INTRAVENOUS at 20:15

## 2022-09-16 RX ADMIN — LISINOPRIL 20 MG: 20 TABLET ORAL at 09:56

## 2022-09-16 RX ADMIN — MAGNESIUM SULFATE HEPTAHYDRATE 2000 MG: 40 INJECTION, SOLUTION INTRAVENOUS at 16:55

## 2022-09-16 RX ADMIN — ENOXAPARIN SODIUM 40 MG: 100 INJECTION SUBCUTANEOUS at 14:21

## 2022-09-16 RX ADMIN — METOPROLOL TARTRATE 5 MG: 5 INJECTION, SOLUTION INTRAVENOUS at 05:57

## 2022-09-16 RX ADMIN — GENTAMICIN SULFATE 1 DROP: 3 SOLUTION OPHTHALMIC at 09:56

## 2022-09-16 RX ADMIN — SODIUM CHLORIDE: 9 INJECTION, SOLUTION INTRAVENOUS at 09:55

## 2022-09-16 RX ADMIN — HYDRALAZINE HYDROCHLORIDE 10 MG: 20 INJECTION INTRAMUSCULAR; INTRAVENOUS at 04:11

## 2022-09-16 RX ADMIN — ONDANSETRON 4 MG: 2 INJECTION INTRAMUSCULAR; INTRAVENOUS at 04:08

## 2022-09-16 RX ADMIN — GENTAMICIN SULFATE 1 DROP: 3 SOLUTION OPHTHALMIC at 14:16

## 2022-09-16 RX ADMIN — SODIUM CHLORIDE 40 MG: 9 INJECTION, SOLUTION INTRAMUSCULAR; INTRAVENOUS; SUBCUTANEOUS at 09:55

## 2022-09-16 NOTE — CONSULTS
Hospitalist Consult Note        Patient:  Balwinder Ko  YOB: 1933  Date of Service: 9/16/2022  MRN: [id number]   Acct:  [de-identified]   Primary Care Physician: Saige Man MD    Chief Complaint:  Diarrhea, nausea/vomiting  Reason for consult  uncontrolled hypertension    Date of Service: Pt seen/examined in consultation on 9/16/2022     History Of Present Illness:      [place] y.o. female who we are asked to see/evaluate by Melisa Sweet MD for medical management of uncontrolled hypertension. Patient is a resident of an [facility] who has history of chronic diarrhea with complete bowel incontinence. Per the H&P, patient developed persistent nausea, vomiting, diarrhea, along with abdominal distention over the 24 hours prior to her admission. Patient was sent from the nursing home to OSH where CT scan was performed being read as high-grade small bowel obstruction. Patient was also found to have a hiatal hernia with possible transverse colon involvement. Patient was transferred to [facility] and admitted under general surgery service for possible small bowel obstruction. Patient does have a history of hypertension and she normally takes lisinopril and metoprolol. She has not been receiving her home medications due to being n.p.o. with an NG tube. Hospital service has been consulted for uncontrolled hypertension. On exam, patient is resting in bed. NG tube was removed the morning of 9/15 and patient was started on clear liquids throughout the day. Patient's daughter is at bedside and reports that initially throughout the day patient was tolerating clear liquids well. However beginning last night patient began to experience more abdominal cramping and diarrhea. Patient currently reports that she feels nauseated and is having abdominal pain. She denies chest pain or shortness of breath. Assessment and Plan:-  SBO: Managed per primary team.  NG tube was removed the morning of 9/15. Patient initially with improvement in symptoms, however overnight now having persistent diarrhea, abdominal pain, nausea again. Primary hypertension, uncontrolled: Blood pressure overnight up to 199/80. Likely exacerbated by abdominal pain. Scheduled IV metoprolol and PRN hydralazine ordered per primary team. Current /65. Patient is asymptomatic. Denies chest pain, shortness of breath, neurological symptoms. At this time, will begin patients home lisinopril if she tolerates oral intake. Recommend continued pain control and antiemetics by primary team. Will continue to monitor BP. Past Medical History:        Diagnosis Date    Arthritis     Cancer ([address] 2021    skin     GERD (gastroesophageal reflux disease)     Hyperlipidemia     Hypertension     Scoliosis        Past Surgical History:        Procedure Laterality Date    CHOLECYSTECTOMY  1980    FACIAL SURGERY N/A 06/14/2021    REVISION OF WOUND AND READVANCEMENT OF FLAP OF SCALP performed by Livia Holguin MD at [place] [address] Right     hip     FRACTURE SURGERY Left     femur    HERNIA REPAIR      JOINT REPLACEMENT Right     knee    SCALP SURGERY N/A 04/05/2021    SCALP REPAIR FLAP performed by Livia Holguin MD at [address] [place] [address] Medications:   No current facility-administered medications on file prior to encounter.      Current Outpatient Medications on File Prior to Encounter   Medication Sig Dispense Refill    docusate sodium (COLACE) 100 MG capsule Take 100 mg by mouth 2 times daily      melatonin 3 MG TABS tablet Take 3 mg by mouth daily      allopurinol (ZYLOPRIM) 300 MG tablet Take 300 mg by mouth daily      aspirin 81 MG EC tablet Take 81 mg by mouth daily      Multiple Vitamins-Minerals (THERAPEUTIC MULTIVITAMIN-MINERALS) tablet Take 1 tablet by mouth daily      lisinopril (PRINIVIL;ZESTRIL) 20 MG tablet Take 20 mg by mouth daily      metoprolol succinate (TOPROL XL) 50 MG extended release tablet Take 50 mg by mouth daily      Omeprazole 20 MG TBDD Take 20 mg by mouth daily      rosuvastatin (CRESTOR) 5 MG tablet Take 5 mg by mouth daily      acetaminophen (TYLENOL) 325 MG tablet Take 650 mg by mouth every 6 hours as needed for Pain      ondansetron (ZOFRAN) 4 MG tablet Take 4 mg by mouth every 8 hours as needed for Nausea or Vomiting         Allergies:    Morphine    Social History:    reports that she has quit smoking. She has never used smokeless tobacco. She reports that she does not currently use alcohol. She reports that she does not use drugs. Family History:   No family history on file. Diet:  ADULT DIET; Clear Liquid    Review of systems:   Pertinent positives as noted in the HPI. All other systems reviewed and negative. PHYSICAL EXAM:  BP (!) 184/82   Pulse 83   Temp 98.3 °F (36.8 °C) (Oral)   Resp 16   Ht 5' (1.524 m)   Wt 130 lb 14.4 oz (59.4 kg)   SpO2 96%   BMI 25.56 kg/m²   General appearance: In mild distress related to abdominal pain and nausea, appears stated age and cooperative. HEENT: Normal cephalic, atraumatic without obvious deformity. Pupils equal, round, and reactive to light. Extra ocular muscles intact. Conjunctivae/corneas clear. Neck: Supple, with full range of motion. No jugular venous distention. Trachea midline. Respiratory:  Normal respiratory effort. Clear to auscultation, bilaterally without Rales/Wheezes/Rhonchi. Cardiovascular: Regular rate and rhythm with normal S1/S2 without murmurs, rubs or gallops. Abdomen: Soft, diffuse tenderness, bowel sounds active  Musculoskeletal:  No clubbing, cyanosis or edema bilaterally. Skin: Skin color, texture, turgor normal.  No rashes or lesions. Neurologic:  Neurovascularly intact without any focal sensory/motor deficits.    Psychiatric: Alert and oriented, thought content appropriate, normal insight  Capillary Refill: Brisk,< 3 seconds   Peripheral Pulses: +2 palpable, equal bilaterally     Labs: Recent Labs     09/14/22  0552   WBC 6.9   HGB 9.9*   HCT 31.6*        Recent Labs     09/14/22  0552      K 3.3*      CO2 22*   BUN 9   CREATININE 0.6   CALCIUM 8.5     No results for input(s): AST, ALT, BILIDIR, BILITOT, ALKPHOS in the last 72 hours. No results for input(s): INR in the last 72 hours. No results for input(s): Dawna Height in the last 72 hours. Urinalysis:    No results found for: David Marisel, BACTERIA, RBCUA, BLOODU, SPECGRAV, GLUCOSEU    Radiology:   XR ABDOMEN FOR NG/OG/NE TUBE PLACEMENT   Final Result   Impression:   Enteric tube with tip in the proximal stomach. Other findings as above. This document has been electronically signed by: Priti Gordillo MD on    09/15/2022 02:35 AM      XR ABDOMEN (KUB) (SINGLE AP VIEW)   Final Result   Impression:   Dilated loops of small bowel, with contrast visualized down to    rectosigmoid colon, likely reflecting partial small bowel obstruction. This document has been electronically signed by: Priti Gordillo MD on    09/14/2022 11:04 PM      XR CHEST PORTABLE   Final Result   1. New enteric tube with the tip in the stomach. 2. Abnormal density in the right and to lesser extent left lower lobes consistent with infiltrates and/or effusions. 3. Atherosclerotic calcification in the aortic arch. 4. Thoracic spondylosis. .    5. Surgical clips in the right upper quadrant consistent with previous cholecystectomy. **This report has been created using voice recognition software. It may contain minor errors which are inherent in voice recognition technology. **      Final report electronically signed by DR Dory Torres on 9/14/2022 11:28 AM      XR ABDOMEN (KUB) (SINGLE AP VIEW)   Final Result   1. Dilated loops of small bowel seen centrally with gas within the colon. Findings can relate to partial small bowel obstruction or ileus. **This report has been created using voice recognition software.   It may contain minor errors which are inherent in voice recognition technology. **      Final report electronically signed by Dr Kenneth Vázquez on 9/14/2022 8:18 AM        XR ABDOMEN (KUB) (SINGLE AP VIEW)    Result Date: 9/14/2022  Abdominal x-ray: 1 view. Indication: Small bowel obstruction. Comparison: [facility] - XR ABDOMEN (KUB) (SINGLE AP VIEW) - 09/14/2022 07:35 AM EDT Findings: No gross organomegaly. No gross pneumoperitoneum. Enteric tube, with tip in the mid stomach. Oral contrast in the stomach. Oral contrast identified in the left hemicolon down to rectosigmoid colon Dilated loops of small bowel again identified in the central abdomen. Thoracolumbar degenerative disc disease. Partially imaged left femoral fixation nail/screw. Lower lumbar spine posterior fusion. Prior cholecystectomy. Impression: Dilated loops of small bowel, with contrast visualized down to rectosigmoid colon, likely reflecting partial small bowel obstruction. This document has been electronically signed by: Michel Cam MD on 09/14/2022 11:04 PM    XR ABDOMEN (KUB) (SINGLE AP VIEW)    Result Date: 9/14/2022  PROCEDURE: XR ABDOMEN (KUB) (SINGLE AP VIEW) CLINICAL INFORMATION: follow up SBO COMPARISON: None TECHNIQUE: AP supine abdomen performed. FINDINGS: Dilated loops of small bowel seen centrally with gas within the colon. No free air. The bones are demineralized. Degenerative changes of the thoracolumbar spine. Degenerative changes of both hips. Surgical hardware is seen in the left femur. Pedicle screws and roderick fixation seen in the lower lumbar spine. Prior cholecystectomy. 1. Dilated loops of small bowel seen centrally with gas within the colon. Findings can relate to partial small bowel obstruction or ileus. **This report has been created using voice recognition software. It may contain minor errors which are inherent in voice recognition technology. ** Final report electronically signed by Dr Kenneth Vázquez on 9/14/2022 8:18 AM    XR CHEST PORTABLE    Result Date: 9/14/2022  PROCEDURE: XR CHEST PORTABLE CLINICAL INFORMATION: Confirmation of course of NG/OG/NE tube and location of tip of tube. COMPARISON: KUB obtained on the same day. . TECHNIQUE: AP upright view of the chest. FINDINGS: There is a new enteric tube with the tip in the stomach. The heart size is normal. There is atherosclerotic calcification in the aortic arch. .  There is abnormal density in the right lower lobe and to lesser extent left lower lobe consistent with infiltrates and/or effusions. There is evidence for granulomatous disease in left midlung field. The pulmonary vascularity is normal. There is thoracic spondylosis. There are degenerative changes involving the glenohumeral and acromioclavicular joints bilaterally. There are surgical clips in the right upper quadrant consistent with previous cholecystectomy. 1. New enteric tube with the tip in the stomach. 2. Abnormal density in the right and to lesser extent left lower lobes consistent with infiltrates and/or effusions. 3. Atherosclerotic calcification in the aortic arch. 4. Thoracic spondylosis. . 5. Surgical clips in the right upper quadrant consistent with previous cholecystectomy. **This report has been created using voice recognition software. It may contain minor errors which are inherent in voice recognition technology. ** Final report electronically signed by DR Louis Ambrosio on 9/14/2022 11:28 AM        EKG: pending      Brenda Zarco    Electronically signed by TERRY Hodgson CNP on 9/16/2022 at 3:44 AM

## 2022-09-16 NOTE — CARE COORDINATION
9/16/22, 8:19 AM EDT    Patient goals/plan/ treatment preferences discussed by  and . Patient goals/plan/ treatment preferences reviewed with patient/ family. Patient/ family verbalize understanding of discharge plan and are in agreement with goal/plan/treatment preferences. Understanding was demonstrated using the teach back method. AVS provided by RN at time of discharge, which includes all necessary medical information pertaining to the patients current course of illness, treatment, post-discharge goals of care, and treatment preferences. Services At/After Discharge: [facility] (SNF), OT, and PT       IMM Letter  IMM Letter given to Patient/Family/Significant other/Guardian/POA/by[de-identified] Rae SMITH Case Manager. IMM Letter date given[de-identified] 09/16/22  IMM Letter time given[de-identified] 36     SW noted possible discharge Sunday/Monday pending progress. Blue envelope on chart with discharge instructions attached. Pt will return to [facility] per family transportation. RN is aware.

## 2022-09-16 NOTE — PROGRESS NOTES
General Surgery  Dr Jesse Umaña  Daily Progress Note      Patient:  Madi Latham      Unit/Bed:8A-11/011-A    YOB: 1933    MRN: [id number]     Acct: [de-identified]     Admit date: 9/13/2022    Subjective: patient resting in bed. Slept most of visit, she states no abdominal pain. Or N&V. No chest pain or SOB. She does appear to have whole body pain when touched, she is very sensitive which is new. Hypertensive last evening secondary to increased pain, which we were unaware, hospitalist consult placed and seen. Had multiple loose stools, tolerated clear liquids. Daughter at bedside, all questions answered. All other ROS negative except noted in HPI      Patient Seen, Chart, Consults notes, Labs, Radiology studies reviewed. Past, Family, Social History unchanged from admission. Diet:  ADULT DIET; Full Liquid    Medications:  Scheduled Meds:   lisinopril  20 mg Oral Daily    gentamicin  1 drop Both Eyes TID    metoprolol  5 mg IntraVENous Q6H    pantoprazole (PROTONIX) 40 mg injection  40 mg IntraVENous Daily     Continuous Infusions:   sodium chloride 120 mL/hr at 09/16/22 0955     PRN Meds:potassium chloride **OR** potassium alternative oral replacement **OR** potassium chloride, ondansetron, hydrALAZINE, fentanNYL **OR** fentanNYL    Objective:    CBC:   Recent Labs     09/14/22  0552 09/16/22  0544   WBC 6.9 7.2   HGB 9.9* 9.8*    213       BMP:    Recent Labs     09/14/22  0552 09/16/22  0544    144   K 3.3* 3.1*    113*   CO2 22* 19*   BUN 9 13   CREATININE 0.6 0.5   GLUCOSE 89 84       Calcium:  Recent Labs     09/16/22  0544   CALCIUM 8.1*       Ionized Calcium:No results for input(s): IONCA in the last 72 hours. Magnesium:No results for input(s): MG in the last 72 hours. Phosphorus:No results for input(s): PHOS in the last 72 hours. BNP:No results for input(s): BNP in the last 72 hours. Glucose:No results for input(s): POCGLU in the last 72 hours.   HgbA1C: No results for input(s): LABA1C in the last 72 hours. INR: No results for input(s): INR in the last 72 hours. Hepatic: No results for input(s): ALKPHOS, ALT, AST, PROT, BILITOT, BILIDIR, LABALBU in the last 72 hours. Amylase and Lipase:No results for input(s): LACTA, AMYLASE in the last 72 hours. Lactic Acid: No results for input(s): LACTA in the last 72 hours. Troponin: No results for input(s): CKTOTAL, CKMB, TROPONINT in the last 72 hours. BNP: No results for input(s): BNP in the last 72 hours. Lipids: No results for input(s): CHOL, TRIG, HDL, LDL, LDLCALC in the last 72 hours. ABGs: No results found for: PH, PCO2, PO2, HCO3, O2SAT    Radiology reports as per the Radiologist  Radiology: XR ABDOMEN (KUB) (SINGLE AP VIEW)    Result Date: 9/14/2022  PROCEDURE: XR ABDOMEN (KUB) (SINGLE AP VIEW) CLINICAL INFORMATION: follow up SBO COMPARISON: None TECHNIQUE: AP supine abdomen performed. FINDINGS: Dilated loops of small bowel seen centrally with gas within the colon. No free air. The bones are demineralized. Degenerative changes of the thoracolumbar spine. Degenerative changes of both hips. Surgical hardware is seen in the left femur. Pedicle screws and roderick fixation seen in the lower lumbar spine. Prior cholecystectomy. 1. Dilated loops of small bowel seen centrally with gas within the colon. Findings can relate to partial small bowel obstruction or ileus. **This report has been created using voice recognition software. It may contain minor errors which are inherent in voice recognition technology. ** Final report electronically signed by Dr Liza Espinal on 9/14/2022 8:18 AM       Physical Exam:  Vitals: BP (!) 173/77   Pulse 76   Temp 98.4 °F (36.9 °C) (Oral)   Resp 18   Ht 5' (1.524 m)   Wt 130 lb 14.4 oz (59.4 kg)   SpO2 95%   BMI 25.56 kg/m²   24 hour intake/output:No intake or output data in the 24 hours ending 09/16/22 1145    Last 3 weights:   Wt Readings from Last 3 Encounters:   09/13/22 130 lb 14.4 oz (59.4 kg)   06/14/21 110 lb (49.9 kg)   04/05/21 109 lb (49.4 kg)       General appearance - oriented to person, place, and time  HEENT: Normocephalic and Atraumatic, [place]  Chest - clear to auscultation, no wheezes, rales or rhonchi, symmetric air entry  Cardiovascular - normal rate and regular rhythm  Abdomen - soft, nontender, nondistended, no masses or organomegaly   Neurological - Alert and oriented and Normal speech  Integumentary - Skin color, texture, turgor normal. No Rashes or lesions  Musculoskeletal -Full ROM times 4 extremities, weakness, full body tenderness with touch       DVT prophylaxis: [x] Lovenox                                 [x] SCDs                                 [] SQ Heparin                                 [] Encourage ambulation           [] Already on Anticoagulation                 Assessment:  SBO resolved  HTN - improved, exacerbated from pain   Hypokalemia   Periorbital cellulitis - drainage improving   Multiple loose water stools       Principal Problem:    SBO (small bowel obstruction) ([address]  Active Problems:    Small bowel obstruction ([address]    Uncontrolled hypertension  Resolved Problems:    * No resolved hospital problems. *      Plan:  Conservative treatment  Full liquids   NG tube removed  IV hydration continued   Analgesia and antiemetics as needed  Antibiotic eye drops x 7 days   Monitor Labs, lytes per protocol  GI prophylaxis and DVT prophylaxis   Gastrografin challenge- contrast in the rectosigmoid colon    Hospitalist consult for HTN management   Stool panel ordered   Discharge planning Sunday/Monday pending progress       Total time spent in care of patient:  20 minutes collectively between subjective/objective examination, chart review, documentation, clinical reasoning and discussion with attending regarding plan/interval changes.       Electronically signed by TERRY Baca - CNP on 9/16/2022 at 11:45 AM        Patient seen and examined independently by 25 minutes which includes :  Preparing to see the patient( reviewing tests and chart)  Obtaining and reviewing separately obtained history  Performing a medically appropriate examination and evaluation  Ordering medications, tests, or procedures  Counseling and educating the patient/family/caregiver  Care coordination  Referring and communicating with other healthcare professionals  Documenting clinical information in the EHR  Independent interpretation of results and communicating the results to patient and care team  This includes a direct physical exam as well as all the other encounter activities described above. Time may be discontiguous. Time does not include procedures. Please see our orders that were directed and approved by me if there are any new ones for the updated patient care plan. Above discussed and I agree with documentation and orders placed by Mark Anthony Shah CNP    See any additional comments if needed below for any other updated orders and plans. Patient seen and examined independently by me. Above discussed and I agree with CNP. Labs, cultures, and radiographs where available were reviewed. See orders for the updated patient care plan.     Irma Ramirez MD MD, pt had profuse diarrhea  abd soft pt having total body pain also having n/v  pt states this is fairly nl  try full liq  9/16/2022   12:08 PM

## 2022-09-16 NOTE — PLAN OF CARE
Problem: Discharge Planning  Goal: Discharge to home or other facility with appropriate resources  Outcome: Progressing     Problem: Skin/Tissue Integrity  Goal: Absence of new skin breakdown  Description: 1. Monitor for areas of redness and/or skin breakdown  2. Assess vascular access sites hourly  3. Every 4-6 hours minimum:  Change oxygen saturation probe site  4. Every 4-6 hours:  If on nasal continuous positive airway pressure, respiratory therapy assess nares and determine need for appliance change or resting period.   Outcome: Progressing     Problem: ABCDS Injury Assessment  Goal: Absence of physical injury  Outcome: Progressing     Problem: Safety - Adult  Goal: Free from fall injury  Outcome: Progressing     Problem: Nutrition Deficit:  Goal: Optimize nutritional status  Outcome: Progressing     Problem: Pain  Goal: Verbalizes/displays adequate comfort level or baseline comfort level  Outcome: Progressing     Problem: Neurosensory - Adult  Goal: Achieves maximal functionality and self care  Outcome: Progressing     Problem: Cardiovascular - Adult  Goal: Maintains optimal cardiac output and hemodynamic stability  Outcome: Progressing     Problem: Skin/Tissue Integrity - Adult  Goal: Skin integrity remains intact  Outcome: Progressing  Goal: Oral mucous membranes remain intact  Outcome: Progressing     Problem: Musculoskeletal - Adult  Goal: Return mobility to safest level of function  Outcome: Progressing  Goal: Return ADL status to a safe level of function  Outcome: Progressing     Problem: Gastrointestinal - Adult  Goal: Minimal or absence of nausea and vomiting  Outcome: Progressing  Goal: Maintains or returns to baseline bowel function  Outcome: Progressing  Goal: Maintains adequate nutritional intake  Outcome: Progressing     Problem: Genitourinary - Adult  Goal: Absence of urinary retention  Outcome: Progressing

## 2022-09-16 NOTE — CARE COORDINATION
9/16/22, 12:37 PM EDT    DISCHARGE ON GOING [place] day: 3  Location: 8A-11/011-A Reason for admit: SBO (small bowel obstruction) ([facility] [K56.609]  Small bowel obstruction ([facility] [N09.513]   Procedure: No.   Barriers to Discharge: BP elevation noted. NG out yesterday but experienced diarrhea, abd pain, nausea. BP late am 173/77. Cont conservative tx with IV hydration. Full liquids. PCP: Ariel Haney MD  Readmission Risk Score: 13.8%  Patient Goals/Plan/Treatment Preferences: From [employer] [facility] and plans return at discharge. SW following.

## 2022-09-16 NOTE — PLAN OF CARE
Pt seen and examined[facility] was having pain \"all over\" when she was touched. Upon exam, pt denies any point tenderness on exam or pain at this time. RN concerned about swallowing. Son reports hx of esophageal dilation in the past. SLP consultation. We will continue to monitor PB, home Lisinopril has been resumed. Stop IV metoprolol and switch to PO Norvasc 5 mg.      Electronically signed by Siena Ny PA-C on 9/16/2022 at 11:49 AM

## 2022-09-16 NOTE — PROGRESS NOTES
[facility]  SPEECH THERAPY  [facility] MED SURG 8A  Clinical Swallow Evaluation      SLP Individual Minutes  Time In: 9462  Time Out: 1342  Minutes: 16  Timed Code Treatment Minutes: 0 Minutes       Date: 2022  Patient Name: Najma Quinn      CSN: [id number]   : 1933  (80 y.o.)  Gender: female   Referring Physician:  Kody Morse PA-C    Diagnosis: SBO (small bowel obstruction) ([facility] [address]    History of Present Illness/Injury: Patient admitted with above diagnosis. 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 Apparently, over the last 24 hours developed persistent nausea and vomiting, diarrhea, along with abdominal distention and she  was sent from the nursing home at [place] [address].  There a CT scan was performed that was being read as a high-grade small bowel obstruction, although no transition point was seen. The patient apparently has seen Dr. Maximiliano Crump for cholecystectomy, a surgeon at [facility] in the past, but when he was called about this situation because the patient was also found to have a hiatal hernia with possibly some transverse colon involved, he felt the patient should be  transferred here. The patient has been transferred, we made a direct admit, and is being seen on the [facility] floor. She again has had issues with this in the past.  It should be noted the son was at the bedside. \"     ST consulted to complete clinical swallow evaluation to assess current swallow and recommend safest level of po intake and/or need for instrumental evaluation d/t reports of increased difficulty with consumption of medications whole with thin liquids. CXR 22  Impression   1. New enteric tube with the tip in the stomach. 2. Abnormal density in the right and to lesser extent left lower lobes consistent with infiltrates and/or effusions. 3. Atherosclerotic calcification in the aortic arch. 4. Thoracic spondylosis. .    5. Surgical clips in the right upper quadrant consistent with previous cholecystectomy. Past Medical History:   Diagnosis Date    Arthritis     Cancer ([facility] [address] 2021    skin     GERD (gastroesophageal reflux disease)     Hyperlipidemia     Hypertension     Scoliosis        SUBJECTIVE:  RN Elisa Adame approved completion of clinical swallow evaluation. Upon arrival to room, patient resting in bed with son at bedside and easily awakened to name. Patient agreeable to limited po trials this date. OBJECTIVE:    Pain:  No pain reported. Current Diet: Full Liquids per GI     Respiratory Status:  Room Air    Behavioral Observation:  Alert and Oriented    CRANIAL NERVE ASSESSMENT   CN V (Trigeminal) Closes and Opens Mandible [facility] [place]    Rotary Jaw Movement Not Tested      CN VII (Facial) Cheeks Hold Food out of Sulci WFL    Opens, Closes/Seals, Protrudes, Retracts Lips WFL    General Appearance WFL    Sensation WFL      CN X (Vagus - Pharyngeal) Raises Back of Tongue WFL      CN XI (Accessory) Lifts Soft Palate WFL      CN XII (Hypoglossal) Elevates Tongue Up and Back WFL    Protrusion   WFL    Lateralizes Tongue WFL    Sensation WFL      Other Observations Dentition WFL     Vocal Quality WFL    Cough DNT      Patient Evaluated Using: Thin Liquids and Puree    Oral Phase:  WFL    Pharyngeal Phase: Impaired:  Delayed Swallow, Audible Swallow, Suspected Pharyngeal Residue, and Suspected Decreased Airway Protection    Signs and Symptoms of Laryngeal Penetration/Aspiration: Throat Clear    Impressions: [facility] given limited PO trials of thin liquids and puree with inability to fully discern potential presence of pharyngeal phase deficits without formal instrumentation.  Patient with adequate labial seal and generation of intraoral pressure for suction via straw. Suspected adequate bolus control and formation resulting in complete bolus clearance. Concerns for suspected delayed swallow initiation given presence of audible swallow with thin liquids. Concerns for possible pharyngeal residue and decreased airway protection given utilization of double swallow and delayed throat clear observed x2 during trials this date. Of course pharyngeal dysfunction and totality of airway invasion events cannot be formally assessed without presence of instrumental evaluation; however, instrumental evaluation is not indicated at this time. Recommend patient continues with a full liquid diet per GI request with po medications in puree (applesauce). RN Heather Mruo reporting clear, but diminished lung sounds. RECOMMENDATIONS/ASSESSMENT:  Instrumental Evaluation: Instrumental evaluation not indicated at this time. Diet Recommendations:  Full Liquids - medications in puree   Strategies:  Full Upright Position, Small Bite/Sip, Pulmonary Monitoring, Medication in Applesauce, Limit Distractions, and Monitor for Fatigue   Rehabilitation Potential: fair  Discharge Recommendations: SNF    EDUCATION:  Learner: Patient  Education:  Reviewed results and recommendations of this evaluation, Reviewed diet and strategies, Reviewed signs, symptoms and risks of aspiration, Education Related to Potential Risks and Complications Due to Impairment/Illness/Injury, Education Related to Prevention of Recurrence of Impairment/Illness/Injury, and Education Related to [employer] and Wellness  Evaluation of Education: [employer] understanding and Needs further instruction    PLAN:  Skilled SLP intervention on acute care 3-5 x per week or until goals met and/or pt plateaus in function. Specific interventions for next session may include: PO trials. PATIENT GOAL:    Did not state. Will further assess during treatment.     SHORT TERM GOALS:  Short-term Goals  Timeframe for Short-term Goals: 2 weeks  Goal 1: Patient will consume a full liquid diet with no overt s/s of aspiration and adequate endurance to assist with meeting nutrition/hydration needs  Goal 2: Patient will complete advanced PO trials as cleared by GI to determine safest level of po intake. Goal 3: Patient will complete instrumental evaluation should overt s/s of aspiration and/or pulmonary decline to further assess pharyngeal swallow function and recommend safest level of po intake.       LONG TERM GOALS:  No long term goals recommended d/t short CONNER Apodaca ([facility] [address] [place], [address]

## 2022-09-17 LAB
ANION GAP SERPL CALCULATED.3IONS-SCNC: 9 MEQ/L (ref 8–16)
BUN BLDV-MCNC: 10 MG/DL (ref 7–22)
CALCIUM SERPL-MCNC: 8.3 MG/DL (ref 8.5–10.5)
CHLORIDE BLD-SCNC: 115 MEQ/L (ref 98–111)
CO2: 20 MEQ/L (ref 23–33)
CREAT SERPL-MCNC: 0.6 MG/DL (ref 0.4–1.2)
ERYTHROCYTE [DISTWIDTH] IN BLOOD BY AUTOMATED COUNT: 14.2 % (ref 11.5–14.5)
ERYTHROCYTE [DISTWIDTH] IN BLOOD BY AUTOMATED COUNT: 53.1 FL (ref 35–45)
GFR SERPL CREATININE-BSD FRML MDRD: > 90 ML/MIN/1.73M2
GLUCOSE BLD-MCNC: 98 MG/DL (ref 70–108)
HCT VFR BLD CALC: 30.2 % (ref 37–47)
HEMOGLOBIN: 9.7 GM/DL (ref 12–16)
MAGNESIUM: 1.5 MG/DL (ref 1.6–2.4)
MCH RBC QN AUTO: 32.8 PG (ref 26–33)
MCHC RBC AUTO-ENTMCNC: 32.1 GM/DL (ref 32.2–35.5)
MCV RBC AUTO: 102 FL (ref 81–99)
PLATELET # BLD: 202 THOU/MM3 (ref 130–400)
PMV BLD AUTO: 11.4 FL (ref 9.4–12.4)
POTASSIUM SERPL-SCNC: 3.7 MEQ/L (ref 3.5–5.2)
RBC # BLD: 2.96 MILL/MM3 (ref 4.2–5.4)
SODIUM BLD-SCNC: 144 MEQ/L (ref 135–145)
WBC # BLD: 6.4 THOU/MM3 (ref 4.8–10.8)

## 2022-09-17 PROCEDURE — A4216 STERILE WATER/SALINE, 10 ML: HCPCS | Performed by: NURSE PRACTITIONER

## 2022-09-17 PROCEDURE — 6370000000 HC RX 637 (ALT 250 FOR IP)

## 2022-09-17 PROCEDURE — 36415 COLL VENOUS BLD VENIPUNCTURE: CPT

## 2022-09-17 PROCEDURE — 97530 THERAPEUTIC ACTIVITIES: CPT

## 2022-09-17 PROCEDURE — C9113 INJ PANTOPRAZOLE SODIUM, VIA: HCPCS | Performed by: NURSE PRACTITIONER

## 2022-09-17 PROCEDURE — 6360000002 HC RX W HCPCS: Performed by: NURSE PRACTITIONER

## 2022-09-17 PROCEDURE — 85027 COMPLETE CBC AUTOMATED: CPT

## 2022-09-17 PROCEDURE — 6370000000 HC RX 637 (ALT 250 FOR IP): Performed by: PHYSICIAN ASSISTANT

## 2022-09-17 PROCEDURE — 2580000003 HC RX 258: Performed by: NURSE PRACTITIONER

## 2022-09-17 PROCEDURE — 97162 PT EVAL MOD COMPLEX 30 MIN: CPT

## 2022-09-17 PROCEDURE — 99232 SBSQ HOSP IP/OBS MODERATE 35: CPT | Performed by: SURGERY

## 2022-09-17 PROCEDURE — 83735 ASSAY OF MAGNESIUM: CPT

## 2022-09-17 PROCEDURE — 1200000000 HC SEMI PRIVATE

## 2022-09-17 PROCEDURE — 80048 BASIC METABOLIC PNL TOTAL CA: CPT

## 2022-09-17 RX ORDER — PANTOPRAZOLE SODIUM 40 MG/1
40 TABLET, DELAYED RELEASE ORAL
Status: DISCONTINUED | OUTPATIENT
Start: 2022-09-18 | End: 2022-09-19 | Stop reason: HOSPADM

## 2022-09-17 RX ORDER — LISINOPRIL 40 MG/1
40 TABLET ORAL DAILY
Status: DISCONTINUED | OUTPATIENT
Start: 2022-09-18 | End: 2022-09-19 | Stop reason: HOSPADM

## 2022-09-17 RX ADMIN — GENTAMICIN SULFATE 1 DROP: 3 SOLUTION OPHTHALMIC at 09:02

## 2022-09-17 RX ADMIN — AMLODIPINE BESYLATE 10 MG: 10 TABLET ORAL at 09:02

## 2022-09-17 RX ADMIN — GENTAMICIN SULFATE 1 DROP: 3 SOLUTION OPHTHALMIC at 13:58

## 2022-09-17 RX ADMIN — SODIUM CHLORIDE 40 MG: 9 INJECTION, SOLUTION INTRAMUSCULAR; INTRAVENOUS; SUBCUTANEOUS at 09:02

## 2022-09-17 RX ADMIN — GENTAMICIN SULFATE 1 DROP: 3 SOLUTION OPHTHALMIC at 21:41

## 2022-09-17 RX ADMIN — LISINOPRIL 20 MG: 20 TABLET ORAL at 09:02

## 2022-09-17 RX ADMIN — METOPROLOL TARTRATE 25 MG: 25 TABLET, FILM COATED ORAL at 21:43

## 2022-09-17 RX ADMIN — ENOXAPARIN SODIUM 40 MG: 100 INJECTION SUBCUTANEOUS at 09:02

## 2022-09-17 RX ADMIN — ONDANSETRON 4 MG: 2 INJECTION INTRAMUSCULAR; INTRAVENOUS at 21:00

## 2022-09-17 RX ADMIN — METOPROLOL TARTRATE 25 MG: 25 TABLET, FILM COATED ORAL at 13:58

## 2022-09-17 RX ADMIN — HYDRALAZINE HYDROCHLORIDE 10 MG: 20 INJECTION INTRAMUSCULAR; INTRAVENOUS at 18:15

## 2022-09-17 NOTE — PROGRESS NOTES
General Surgery  Dr. Soumya Mahoney covering for  Dr Tristan Gutierrez  Daily Progress Note      Patient:  Fabiola Carl      Unit/Bed:8A-11/011-A    YOB: 1933    MRN: [id number]     Acct: [de-identified]     Admit date: 9/13/2022    Subjective: Stable overnight. Chart reviewed. Updated by nursing staff. Afebrile. Vital signs stable. Mild hypertension. Hospitalist service following. Tolerating liquids. Diarrhea improved. Denies abdominal pain or tenderness. No bloating/distention. No nausea or vomiting. No chest pain or shortness of breath. Daughter states that she had a really good night. Daughter at bedside during evaluation, all questions answered. All other ROS negative except noted in HPI      Patient Seen, Chart, Consults notes, Labs, Radiology studies reviewed. Past, Family, Social History unchanged from admission. Diet:  ADULT DIET; Dysphagia - Soft and Bite Sized    Medications:  Scheduled Meds:   lisinopril  20 mg Oral Daily    enoxaparin  40 mg SubCUTAneous Daily    amLODIPine  10 mg Oral Daily    gentamicin  1 drop Both Eyes TID    pantoprazole (PROTONIX) 40 mg injection  40 mg IntraVENous Daily     Continuous Infusions:   sodium chloride 50 mL/hr at 09/16/22 2015     PRN Meds:magnesium sulfate, potassium chloride **OR** potassium alternative oral replacement **OR** potassium chloride, ondansetron, hydrALAZINE, fentanNYL **OR** fentanNYL    Objective:    CBC:   Recent Labs     09/16/22 0544 09/17/22  0533   WBC 7.2 6.4   HGB 9.8* 9.7*    202     BMP:    Recent Labs     09/16/22 0544 09/17/22  0533    144   K 3.1* 3.7   * 115*   CO2 19* 20*   BUN 13 10   CREATININE 0.5 0.6   GLUCOSE 84 98     Calcium:  Recent Labs     09/17/22  0533   CALCIUM 8.3*     Ionized Calcium:No results for input(s): IONCA in the last 72 hours. Magnesium:  Recent Labs     09/17/22  0533   MG 1.5*     Phosphorus:No results for input(s): PHOS in the last 72 hours.   BNP:No results for input(s): BNP in the last 72 hours. Glucose:No results for input(s): POCGLU in the last 72 hours. HgbA1C: No results for input(s): LABA1C in the last 72 hours. INR: No results for input(s): INR in the last 72 hours. Hepatic: No results for input(s): ALKPHOS, ALT, AST, PROT, BILITOT, BILIDIR, LABALBU in the last 72 hours. Amylase and Lipase:No results for input(s): LACTA, AMYLASE in the last 72 hours. Lactic Acid: No results for input(s): LACTA in the last 72 hours. Troponin: No results for input(s): CKTOTAL, CKMB, TROPONINT in the last 72 hours. BNP: No results for input(s): BNP in the last 72 hours. Lipids: No results for input(s): CHOL, TRIG, HDL, LDL, LDLCALC in the last 72 hours. ABGs: No results found for: PH, PCO2, PO2, HCO3, O2SAT    Radiology reports as per the Radiologist  Radiology: XR ABDOMEN (KUB) (SINGLE AP VIEW)    Result Date: 9/14/2022  PROCEDURE: XR ABDOMEN (KUB) (SINGLE AP VIEW) CLINICAL INFORMATION: follow up SBO COMPARISON: None TECHNIQUE: AP supine abdomen performed. FINDINGS: Dilated loops of small bowel seen centrally with gas within the colon. No free air. The bones are demineralized. Degenerative changes of the thoracolumbar spine. Degenerative changes of both hips. Surgical hardware is seen in the left femur. Pedicle screws and roderick fixation seen in the lower lumbar spine. Prior cholecystectomy. 1. Dilated loops of small bowel seen centrally with gas within the colon. Findings can relate to partial small bowel obstruction or ileus. **This report has been created using voice recognition software. It may contain minor errors which are inherent in voice recognition technology. ** Final report electronically signed by Dr Morenita Isaac on 9/14/2022 8:18 AM       Physical Exam:  Vitals: BP (!) 160/75   Pulse 75   Temp 98.6 °F (37 °C) (Oral)   Resp 16   Ht 5' (1.524 m)   Wt 130 lb 14.4 oz (59.4 kg)   SpO2 95%   BMI 25.56 kg/m²   24 hour intake/output:  Intake/Output Summary (Last 24 hours) at 9/17/2022 0815  Last data filed at 9/16/2022 1610  Gross per 24 hour   Intake 7101.09 ml   Output 1 ml   Net 7100.09 ml     Last 3 weights: Wt Readings from Last 3 Encounters:   09/13/22 130 lb 14.4 oz (59.4 kg)   06/14/21 110 lb (49.9 kg)   04/05/21 109 lb (49.4 kg)       General appearance - oriented to person, place, and time  HEENT: Normocephalic and Atraumatic, [place]  Chest - clear to auscultation, no wheezes, rales or rhonchi, symmetric air entry  Cardiovascular - normal rate and regular rhythm  Abdomen - soft, nontender, nondistended, no masses or organomegaly   Neurological - Alert and oriented and Normal speech  Integumentary - Skin color, texture, turgor normal. No Rashes or lesions  Musculoskeletal -Full ROM times 4 extremities, weakness, full body tenderness with touch       DVT prophylaxis: [x] Lovenox                                 [x] SCDs                                 [] SQ Heparin                                 [] Encourage ambulation           [] Already on Anticoagulation                 Assessment:  SBO resolved  HTN - improved, exacerbated from pain   Hypokalemia   Periorbital cellulitis - drainage improving   Multiple loose water stools       Principal Problem:    SBO (small bowel obstruction) ([address]  Active Problems:    Small bowel obstruction ([address]    Uncontrolled hypertension  Resolved Problems:    * No resolved hospital problems.  *      Plan:  Conservative treatment  Soft diet today   Decrease IV fluid rate  Analgesia and antiemetics as needed  Antibiotic eye drops x 7 days   Monitor Labs, lytes per protocol  GI prophylaxis and DVT prophylaxis   Gastrografin challenge- contrast in the rectosigmoid colon    Hospitalist consulted for HTN management   Stool panel ordered   Discharge planning Sunday/Monday pending progress       Total time spent in care of patient:  25 minutes collectively between subjective/objective examination, chart review, documentation, clinical reasoning and discussion with patient, family and nursing staff regarding plan/interval changes.       Electronically signed by Marzena Espana MD on 9/17/2022 at 8:15 AM

## 2022-09-17 NOTE — PROGRESS NOTES
[facility]  INPATIENT PHYSICAL THERAPY  EVALUATION  [facility] MED SURG 8A - 8A-11/011-A    Time In: 8356  Time Out: 6614  Timed Code Treatment Minutes: 15 Minutes  Minutes: 31          Date: 2022  Patient Name: Omer Leventhal,  Gender:  female        MRN: [id number]  : 1933  (80 y.o.)      Referring Practitioner: Amna Soto PA-C  Diagnosis: SBO (small bowel obstruction)  Additional Pertinent Hx: Pt admitted for SBO with non-surgical management. Restrictions/Precautions:  Restrictions/Precautions: General Precautions, Fall Risk  Position Activity Restriction  Other position/activity restrictions: R drop foot    Subjective:  Chart Reviewed: Yes  Patient assessed for rehabilitation services?: Yes  Family / Caregiver Present: Yes (daughter Nataly Woodward)  Subjective: RN approved PT evaluation, stating family is hopeful that pt can be evaluated by PT, as she has been getting PT services on and off while in the nursing home. Pt is hopeful to continue therapy at discharge. Per pt's daughter, the pt has not been OOB in 4 days, and typically does get OOB at [facility] each day with assistance.     General:     Vision: Impaired  Vision Exceptions: Wears glasses at all times  Hearing: Within functional limits       Pain: none stated     Vitals: Vitals not assessed per clinical judgement, see nursing flowsheet    Social/Functional History:    Lives With: Alone  Type of Home: Facility ([facility])  Home Equipment: Walker, rolling      Ambulation Assistance: Needs assistance  Transfer Assistance: Needs assistance    Additional Comments: has been at [facility] for 3 years; pt has had PT on and off---has been using walker for short distances, bike, and w/c; pt states she recently improved in walking ability during therapy    OBJECTIVE:  Range of Motion:  Bilateral Lower Extremity: WFL    Strength:  Bilateral Lower Extremity: Impaired - knee extension and hip flexion assessed prior to transfer, both < 3/5; significant foot drop on R LE; significant functional weakness and atrophy     Balance:  Static Sitting Balance:  Stand By Assistance  Dynamic Sitting Balance: Stand By Assistance  Static Standing Balance: Minimal Assistance, X 2  Dynamic Standing Balance: Minimal Assistance, X 2    Bed Mobility:  Supine to Sit: Maximum Assistance, X 1, with head of bed raised, with rail, with verbal cues , with increased time for completion  Scooting: Maximum Assistance, X 1    Transfers:  Sit to Stand: Min/Mod X 2, with increased time for completion, cues for hand placement, with verbal cues  Stand to Sit:Minimal Assistance, X 2, cues for hand placement, with verbal cues, to/from chair with arms  *pt's daughter volunteered to assist with transfer (she denies any conditions that would prevent her from safely assisting with transfer; PT guiding her verbally and demonstrating techniques throughout)    Ambulation:  Minimal Assistance, X 2, with cues for safety, with verbal cues , with increased time for completion  Distance: ~2' from chair to bed  Surface: Level Tile  Device:Rolling Walker  Gait Deviations: Forward Flexed Posture, Slow Sandra, Decreased Step Length Bilaterally, Decreased Gait Speed, Unsteady Gait, Decreased Terminal Knee Extension, and Sliding feet instead of picking them up for each step   *pt's RR quickly increased and she appeared very fatigued quickly     Functional Outcome Measures: Completed  AM-PAC Inpatient Mobility Raw Score : 8  AM-PAC Inpatient T-Scale Score : 28.52    ASSESSMENT:  Activity Tolerance:  Patient tolerance of  treatment: fair. Limited by fatigue       Treatment Initiated: Treatment and education initiated within context of evaluation.   Evaluation time included review of current medical information, gathering information related to past medical, social and functional history, completion of standardized testing, formal and informal observation of tasks, assessment of data and development of plan of care and goals.  Treatment time included skilled education and facilitation of tasks to increase safety and independence with functional mobility for improved independence and quality of life. Assessment: Body Structures, Functions, Activity Limitations Requiring Skilled Therapeutic Intervention: Decreased functional mobility , Decreased endurance, Decreased strength, Decreased posture, Decreased balance  Assessment: Pt is below PLOF by way of decreased functional ability to perform transfers and ambulation. She presents with significant weakness, decreased balance, and poor posture. Pt will benefit from continued physical therapy to return to PLOF. Therapy Prognosis: Good    Requires PT Follow-Up: Yes    Discharge Recommendations:  Discharge Recommendations: [address]    Patient Education:      . Patient Education  Education Given To: Patient, Family  Education Provided: Role of Therapy, Plan of Care  Education Method: Verbal, Demonstration  Barriers to Learning: None  Education Outcome: Verbalized understanding, Demonstrated understanding, Continued education needed       Equipment Recommendations:  Equipment Needed: No    Plan:  Specific Instructions for Next Treatment: strengthening and transfers  Current Treatment Recommendations: Strengthening, Balance training, Gait training, Stair training, Functional mobility training, Transfer training, Endurance training, Therapeutic activities, Patient/Caregiver education & training, Safety education & training, Home exercise program  Plan:  (3-5x GM)  Specific Instructions for Next Treatment: strengthening and transfers    Goals:  Patient goals : return to [facility], get out of bed  Short Term Goals  Time Frame for Short term goals: by hospital discharge  Short term goal 1: Supine to/from sit with Min A x 1 for ease of transfers. Short term goal 2: Sit to/from stand with Mod A x 1 for ease of transfers.   Short term goal 3: Ambulate 8' with Min A x 2 for improvd household distance ambulation. Short term goal 4: Stand pivot transfer to/from various surfaces with Min A x 1 for ease of transferring. Additional Goals?: No  Long Term Goals  Time Frame for Long term goals : N/A due to short ELOS  Additional Goals?: No    Following session, patient left in safe position with all fall risk precautions in place.     Emily Blanc, PT, DPT

## 2022-09-18 LAB
EKG ATRIAL RATE: 78 BPM
EKG P AXIS: 84 DEGREES
EKG P-R INTERVAL: 168 MS
EKG Q-T INTERVAL: 334 MS
EKG QRS DURATION: 82 MS
EKG QTC CALCULATION (BAZETT): 380 MS
EKG R AXIS: 7 DEGREES
EKG T AXIS: 5 DEGREES
EKG VENTRICULAR RATE: 78 BPM

## 2022-09-18 PROCEDURE — 1200000000 HC SEMI PRIVATE

## 2022-09-18 PROCEDURE — 93010 ELECTROCARDIOGRAM REPORT: CPT | Performed by: INTERNAL MEDICINE

## 2022-09-18 PROCEDURE — 6370000000 HC RX 637 (ALT 250 FOR IP): Performed by: SURGERY

## 2022-09-18 PROCEDURE — 6370000000 HC RX 637 (ALT 250 FOR IP): Performed by: PHYSICIAN ASSISTANT

## 2022-09-18 PROCEDURE — 99232 SBSQ HOSP IP/OBS MODERATE 35: CPT | Performed by: SURGERY

## 2022-09-18 PROCEDURE — 97166 OT EVAL MOD COMPLEX 45 MIN: CPT

## 2022-09-18 PROCEDURE — 97530 THERAPEUTIC ACTIVITIES: CPT

## 2022-09-18 PROCEDURE — 6360000002 HC RX W HCPCS: Performed by: NURSE PRACTITIONER

## 2022-09-18 RX ORDER — ACETAMINOPHEN 325 MG/1
650 TABLET ORAL EVERY 4 HOURS PRN
Status: DISCONTINUED | OUTPATIENT
Start: 2022-09-18 | End: 2022-09-19 | Stop reason: HOSPADM

## 2022-09-18 RX ORDER — HYDRALAZINE HYDROCHLORIDE 25 MG/1
25 TABLET, FILM COATED ORAL EVERY 8 HOURS SCHEDULED
Status: DISCONTINUED | OUTPATIENT
Start: 2022-09-18 | End: 2022-09-19 | Stop reason: HOSPADM

## 2022-09-18 RX ORDER — METOPROLOL TARTRATE 50 MG/1
50 TABLET, FILM COATED ORAL 2 TIMES DAILY
Status: DISCONTINUED | OUTPATIENT
Start: 2022-09-18 | End: 2022-09-19 | Stop reason: HOSPADM

## 2022-09-18 RX ORDER — LANOLIN ALCOHOL/MO/W.PET/CERES
3 CREAM (GRAM) TOPICAL NIGHTLY
Status: DISCONTINUED | OUTPATIENT
Start: 2022-09-18 | End: 2022-09-19 | Stop reason: HOSPADM

## 2022-09-18 RX ADMIN — HYDRALAZINE HYDROCHLORIDE 25 MG: 25 TABLET, FILM COATED ORAL at 17:34

## 2022-09-18 RX ADMIN — ENOXAPARIN SODIUM 40 MG: 100 INJECTION SUBCUTANEOUS at 09:59

## 2022-09-18 RX ADMIN — HYDRALAZINE HYDROCHLORIDE 10 MG: 20 INJECTION INTRAMUSCULAR; INTRAVENOUS at 08:28

## 2022-09-18 RX ADMIN — HYDRALAZINE HYDROCHLORIDE 25 MG: 25 TABLET, FILM COATED ORAL at 21:08

## 2022-09-18 RX ADMIN — Medication 3 MG: at 21:08

## 2022-09-18 RX ADMIN — HYDRALAZINE HYDROCHLORIDE 10 MG: 20 INJECTION INTRAMUSCULAR; INTRAVENOUS at 00:27

## 2022-09-18 RX ADMIN — GENTAMICIN SULFATE 1 DROP: 3 SOLUTION OPHTHALMIC at 09:59

## 2022-09-18 RX ADMIN — GENTAMICIN SULFATE 1 DROP: 3 SOLUTION OPHTHALMIC at 15:37

## 2022-09-18 RX ADMIN — ONDANSETRON 4 MG: 2 INJECTION INTRAMUSCULAR; INTRAVENOUS at 13:19

## 2022-09-18 RX ADMIN — METOPROLOL TARTRATE 25 MG: 25 TABLET, FILM COATED ORAL at 09:58

## 2022-09-18 RX ADMIN — METOPROLOL TARTRATE 50 MG: 50 TABLET, FILM COATED ORAL at 21:06

## 2022-09-18 RX ADMIN — PANTOPRAZOLE SODIUM 40 MG: 40 TABLET, DELAYED RELEASE ORAL at 08:16

## 2022-09-18 RX ADMIN — ACETAMINOPHEN 650 MG: 325 TABLET ORAL at 13:40

## 2022-09-18 RX ADMIN — GENTAMICIN SULFATE 1 DROP: 3 SOLUTION OPHTHALMIC at 21:06

## 2022-09-18 RX ADMIN — LISINOPRIL 40 MG: 40 TABLET ORAL at 09:58

## 2022-09-18 RX ADMIN — AMLODIPINE BESYLATE 10 MG: 10 TABLET ORAL at 09:58

## 2022-09-18 ASSESSMENT — PAIN SCALES - GENERAL: PAINLEVEL_OUTOF10: 3

## 2022-09-18 ASSESSMENT — PAIN DESCRIPTION - ORIENTATION: ORIENTATION: RIGHT

## 2022-09-18 ASSESSMENT — PAIN DESCRIPTION - DESCRIPTORS: DESCRIPTORS: ACHING

## 2022-09-18 ASSESSMENT — PAIN DESCRIPTION - LOCATION: LOCATION: HAND

## 2022-09-18 ASSESSMENT — PAIN - FUNCTIONAL ASSESSMENT: PAIN_FUNCTIONAL_ASSESSMENT: PREVENTS OR INTERFERES SOME ACTIVE ACTIVITIES AND ADLS

## 2022-09-18 NOTE — PROGRESS NOTES
General Surgery  Dr. Maritza Leong covering for  Dr Rj Piña  Daily Progress Note      Patient:  Jen Pain      Unit/Bed:8A-11/011-A    YOB: 1933    MRN: [id number]     Acct: [de-identified]     Admit date: 9/13/2022    Subjective: Stable overnight. Chart reviewed. Updated by nursing staff. Afebrile. Vital signs stable. Mild hypertension. Hospitalist service following. Tolerating liquids. Diarrhea improved. Denies abdominal pain or tenderness. No bloating/distention. No nausea or vomiting. No chest pain or shortness of breath. Patient feeling worse all over, complains of head ache and right hand pain     All other ROS negative except noted in HPI      Patient Seen, Chart, Consults notes, Labs, Radiology studies reviewed. Past, Family, Social History unchanged from admission. Diet:  ADULT DIET; Dysphagia - Soft and Bite Sized    Medications:  Scheduled Meds:   lisinopril  40 mg Oral Daily    pantoprazole  40 mg Oral QAM AC    metoprolol tartrate  25 mg Oral BID    enoxaparin  40 mg SubCUTAneous Daily    amLODIPine  10 mg Oral Daily    gentamicin  1 drop Both Eyes TID     Continuous Infusions:   sodium chloride 20 mL/hr at 09/17/22 0846     PRN Meds:magnesium sulfate, potassium chloride **OR** potassium alternative oral replacement **OR** potassium chloride, ondansetron, hydrALAZINE, fentanNYL **OR** fentanNYL    Objective:    CBC:   Recent Labs     09/16/22  0544 09/17/22  0533   WBC 7.2 6.4   HGB 9.8* 9.7*    202     BMP:    Recent Labs     09/16/22  0544 09/17/22  0533    144   K 3.1* 3.7   * 115*   CO2 19* 20*   BUN 13 10   CREATININE 0.5 0.6   GLUCOSE 84 98     Calcium:  Recent Labs     09/17/22  0533   CALCIUM 8.3*     Ionized Calcium:No results for input(s): IONCA in the last 72 hours. Magnesium:  Recent Labs     09/17/22  0533   MG 1.5*     Phosphorus:No results for input(s): PHOS in the last 72 hours.   BNP:No results for input(s): BNP in the last 72 hours.  Glucose:No results for input(s): POCGLU in the last 72 hours. HgbA1C: No results for input(s): LABA1C in the last 72 hours. INR: No results for input(s): INR in the last 72 hours. Hepatic: No results for input(s): ALKPHOS, ALT, AST, PROT, BILITOT, BILIDIR, LABALBU in the last 72 hours. Amylase and Lipase:No results for input(s): LACTA, AMYLASE in the last 72 hours. Lactic Acid: No results for input(s): LACTA in the last 72 hours. Troponin: No results for input(s): CKTOTAL, CKMB, TROPONINT in the last 72 hours. BNP: No results for input(s): BNP in the last 72 hours. Lipids: No results for input(s): CHOL, TRIG, HDL, LDL, LDLCALC in the last 72 hours. ABGs: No results found for: PH, PCO2, PO2, HCO3, O2SAT    Radiology reports as per the Radiologist  Radiology: XR ABDOMEN (KUB) (SINGLE AP VIEW)    Result Date: 9/14/2022  PROCEDURE: XR ABDOMEN (KUB) (SINGLE AP VIEW) CLINICAL INFORMATION: follow up SBO COMPARISON: None TECHNIQUE: AP supine abdomen performed. FINDINGS: Dilated loops of small bowel seen centrally with gas within the colon. No free air. The bones are demineralized. Degenerative changes of the thoracolumbar spine. Degenerative changes of both hips. Surgical hardware is seen in the left femur. Pedicle screws and roderick fixation seen in the lower lumbar spine. Prior cholecystectomy. 1. Dilated loops of small bowel seen centrally with gas within the colon. Findings can relate to partial small bowel obstruction or ileus. **This report has been created using voice recognition software. It may contain minor errors which are inherent in voice recognition technology. ** Final report electronically signed by Dr Carmen Davies on 9/14/2022 8:18 AM       Physical Exam:  Vitals: BP (!) 168/87   Pulse 84   Temp 97.3 °F (36.3 °C) (Oral)   Resp 16   Ht 5' (1.524 m)   Wt 130 lb 14.4 oz (59.4 kg)   SpO2 95%   BMI 25.56 kg/m²   24 hour intake/output:  Intake/Output Summary (Last 24 hours) at 9/18/2022 [address] [place] [address] filed at 9/18/2022 0813  Gross per 24 hour   Intake --   Output 1350 ml   Net -1350 ml     Last 3 weights: Wt Readings from Last 3 Encounters:   09/13/22 130 lb 14.4 oz (59.4 kg)   06/14/21 110 lb (49.9 kg)   04/05/21 109 lb (49.4 kg)       General appearance - oriented to person, place, and time  HEENT: Normocephalic and Atraumatic, [place]  Chest - clear to auscultation, no wheezes, rales or rhonchi, symmetric air entry  Cardiovascular - normal rate and regular rhythm  Abdomen - soft, nontender, nondistended, no masses or organomegaly   Neurological - Alert and oriented and Normal speech  Integumentary - Skin color, texture, turgor normal. No Rashes or lesions        DVT prophylaxis: [x] Lovenox                                 [x] SCDs                                 [] SQ Heparin                                 [] Encourage ambulation           [] Already on Anticoagulation                 Assessment:  SBO resolved  HTN - improved, exacerbated from pain   Periorbital cellulitis - drainage improving   Multiple loose water stools       Principal Problem:    SBO (small bowel obstruction) ([address]  Active Problems:    Small bowel obstruction ([address]    Uncontrolled hypertension  Resolved Problems:    * No resolved hospital problems.  *      Plan:  Conservative treatment  Soft diet today   Decrease IV fluid rate  Analgesia and antiemetics as needed  Antibiotic eye drops x 7 days   Monitor Labs, lytes per protocol  GI prophylaxis and DVT prophylaxis   Gastrografin challenge- contrast in the rectosigmoid colon    Hospitalist consulted for HTN management   Stool panel ordered   Patient clinically ust feeling worse, son does not feel she is ready for discharge           Electronically signed by Christopher Ferrara MD on 9/18/2022 at 11:34 AM

## 2022-09-18 NOTE — PROGRESS NOTES
Occupational Therapy  [facility] [address] [address]  INPATIENT OCCUPATIONAL THERAPY  [facility] 8A  EVALUATION    Time:   Time In: 79  Time Out: 1354  Timed Code Treatment Minutes: 12 Minutes  Minutes: 22          Date: 2022  Patient Name: Jen Momin,   Gender: female      MRN: [id number]  : 1933  (80 y.o.)  Referring Practitioner: Jackie Bender PA-C  Diagnosis: SBO       Restrictions/Precautions:  Restrictions/Precautions: General Precautions, Fall Risk  Position Activity Restriction  Other position/activity restrictions: R drop foot    Subjective  Chart Reviewed: Yes, Orders, History and Physical  Patient assessed for rehabilitation services?: Yes    Subjective: Nursing approved OT evaluation. Pt in bed upon OT arrival. pleasant and cooperative and agreeable to evaluation. Son at bedside and very supportive. Pain: right wrist pain. Does not give numeric value. Nursing present to provide pain medication. Ice is also provided by nursing. Vitals: Vitals not assessed per clinical judgement, see nursing flowsheet    Social/Functional History:  Lives With: Alone (From [facility])  Type of Home: Facility  Home Layout: One level  Home Equipment: Walker, rolling           Ambulation Assistance: Needs assistance  Transfer Assistance: Needs assistance          Additional Comments: has been at [facility] for 3 years; pt has had PT on and off---has been using walker for short distances, bike, and w/c; pt states she recently improved in walking ability during therapy    VISION:Corrected    HEARING:  WFL    COGNITION: Slow Processing, Decreased Recall, Decreased Problem Solving, and Decreased Safety Awareness    RANGE OF MOTION:  Right Upper Extremity: WFL  Left Upper Extremity:  WFL    STRENGTH:  Right Upper Extremity: Impaired - grossly impaired  Left Upper Extremity:  Impaired - grossly impaired    SENSATION:   WFL    ADL:   Toileting: Dependent. External catheter in place.  Pt reports baseline incontinence . BALANCE:  Standing Balance: Maximum Assistance, X 1, with cues for anterior weight shift. Pt unable to complete full stance from EOB to prepare for transfer with walker. BED MOBILITY:  Supine to Sit: Maximum Assistance, X 1, with head of bed raised, with verbal cues , with increased time for completion      TRANSFERS:  Sit to Stand: Moderate Assistance. Stand Pivot: Maximum Assistance, X 1, cues for hand placement, to/from chair with arms. FUNCTIONAL MOBILITY:  Assistive Device: None  Assist Level:  Not tested. Distance:  NA at this time. Pt unable to safely weight shif tot initiate functional mobility on this date. Activity Tolerance:  Patient tolerance of  treatment: fair. Assessment:  Assessment: Pt presents with exacerbated deficits in strength, balance, ADL status, and functional mobility secondary to hospitalization for SBO. Pt reports she has not been out of bed much and reports feeling increased weakness and demonstrates increased dependence with all transfers and ADLs. Performance deficits / Impairments: Decreased functional mobility , Decreased ADL status, Decreased strength, Decreased balance, Decreased posture  REQUIRES OT FOLLOW-UP: Yes  Decision Making: Medium Complexity    Treatment Initiated: Treatment and education initiated within context of evaluation. Evaluation time included review of current medical information, gathering information related to past medical, social and functional history, completion of standardized testing, formal and informal observation of tasks, assessment of data and development of plan of care and goals. Treatment time included skilled education and facilitation of tasks to increase safety and independence with ADL's for improved functional independence and quality of life.     Discharge Recommendations:  Subacute/Skilled Nursing Facility    Patient Education:     Patient Education  Education Given To: Patient, Family  Education Provided: Role of Therapy, Plan of Care, Transfer Training  Education Method: Demonstration, Verbal  Barriers to Learning: None  Education Outcome: Verbalized understanding, Demonstrated understanding    Equipment Recommendations: defer to next level of care       Plan:  Times per Week: 3-5x  Times per Day: Daily  Current Treatment Recommendations: Strengthening, Self-Care / ADL, Functional mobility training, Balance training, Patient/Caregiver education & training. See long-term goal time frame for expected duration of plan of care. If no long-term goals established, a short length of stay is anticipated. Goals:  Patient goals : to be able to walk to the bathroom  Short Term Goals  Time Frame for Short term goals: until DC  Short Term Goal 1: Pt will improve independence with SPT to Min A x1 with least restrictive AD. Short Term Goal 2: Pt will tolerate x10 minute dynamic sitting to complete UB ADLs and grooming tasks sitting EOB with S.  Short Term Goal 3: Pt will tolerate OTR assessment for functional mobility to improve access to ADLs. Short Term Goal 4: Pt will tolerate x2 minutes standing with CGA to reduce caregiver burden during clothing management and toileting routines. Following session, patient left in safe position with all fall risk precautions in place.

## 2022-09-18 NOTE — PROGRESS NOTES
Hospitalist Progress Note      Patient:  Pacheco Lamp    Unit/Bed:8A-11/011-A  YOB: 1933  MRN: [id number]   Acct: [de-identified]   PCP: Colin Sanabria MD  Date of Admission: 9/13/2022    Assessment/Plan:    SBO: Managed per primary team.  NG tube was removed the morning of 9/15. Patient initially with improvement in symptoms, however overnight now having persistent diarrhea, abdominal pain, nausea again. Uncontrolled essential hypertension: Blood pressure as high as 199/80. Likely exacerbated by pain. Patient is asymptomatic. Denies chest pain, shortness of breath, neurological symptoms. Multiple antihypertensives have been added without great effect, including increasing Lisinopril, adding Metoprolol, adding Hydralazine, and increased Norvasc. Will continue to monitor BP. Macrocytic anemia: no gross bleeding, continue to monitor and transfuse for Hgb < 7  Hypokalemia, resolved  Hypomagnesemia: replete per protocol and repeat in am     Chief Complaint: Diarrhea, nausea/vomiting    Initial H and P:-    [place] y.o. female who we are asked to see/evaluate by Jairo Owusu MD for medical management of uncontrolled hypertension. Patient is a resident of an [facility] who has history of chronic diarrhea with complete bowel incontinence. Per the H&P, patient developed persistent nausea, vomiting, diarrhea, along with abdominal distention over the 24 hours prior to her admission. Patient was sent from the nursing home to OSH where CT scan was performed being read as high-grade small bowel obstruction. Patient was also found to have a hiatal hernia with possible transverse colon involvement. Patient was transferred to [facility] and admitted under general surgery service for possible small bowel obstruction. Patient does have a history of hypertension and she normally takes lisinopril and metoprolol.   She has not been receiving her home medications due to being n.p.o. with an NG tube. Hospital service has been consulted for uncontrolled hypertension. On exam, patient is resting in bed. NG tube was removed the morning of 9/15 and patient was started on clear liquids throughout the day. Patient's daughter is at bedside and reports that initially throughout the day patient was tolerating clear liquids well. However beginning last night patient began to experience more abdominal cramping and diarrhea. Patient currently reports that she feels nauseated and is having abdominal pain. She denies chest pain or shortness of breath. \"    Subjective (past 24 hours):   9/18: pt complaining of nausea and no appetite. Reports pain that came on suddenly this morning in her R wrist. No CP/SOB. Not sleeping well per family. Past medical history, family history, social history and allergies reviewed again and is unchanged since admission. ROS (All review of systems completed. Pertinent positives noted. Otherwise All other systems reviewed and negative.)     Medications:  Reviewed    Infusion Medications    sodium chloride 20 mL/hr at 09/17/22 0846     Scheduled Medications    metoprolol tartrate  50 mg Oral BID    melatonin  3 mg Oral Nightly    hydrALAZINE  25 mg Oral 3 times per day    lisinopril  40 mg Oral Daily    pantoprazole  40 mg Oral QAM AC    enoxaparin  40 mg SubCUTAneous Daily    amLODIPine  10 mg Oral Daily    gentamicin  1 drop Both Eyes TID     PRN Meds: acetaminophen, magnesium sulfate, potassium chloride **OR** potassium alternative oral replacement **OR** potassium chloride, ondansetron, hydrALAZINE, fentanNYL **OR** fentanNYL      Intake/Output Summary (Last 24 hours) at 9/18/2022 1539  Last data filed at 9/18/2022 0813  Gross per 24 hour   Intake --   Output 1350 ml   Net -1350 ml       Diet:  ADULT DIET;  Dysphagia - Soft and Bite Sized    Physical Exam:  BP (!) 168/87   Pulse 84   Temp 97.3 °F (36.3 °C) (Oral)   Resp 16   Ht 5' (1.524 m) Wt 130 lb 14.4 oz (59.4 kg)   SpO2 95%   BMI 25.56 kg/m²   General appearance: elderly, grail, pleasant, no apparent distress, appears stated age and cooperative. HEENT: Pupils equal, round, and reactive to light. Conjunctivae/corneas clear. Neck: Supple, with full range of motion. No jugular venous distention. Trachea midline. Respiratory:  Normal respiratory effort. Clear to auscultation, bilaterally without Rales/Wheezes/Rhonchi. Cardiovascular: Regular rate and rhythm with normal S1/S2 without murmurs, rubs or gallops. Abdomen: Soft, non-tender, non-distended with normal bowel sounds. Musculoskeletal: passive and active ROM x 4 extremities. Skin: Skin color, texture, turgor normal.  No rashes or lesions. Neurologic:  Neurovascularly intact without any focal sensory/motor deficits. Cranial nerves: II-XII intact, grossly non-focal.  Psychiatric: Alert and oriented x4, thought content appropriate, normal insight  Capillary Refill: Brisk,< 3 seconds   Peripheral Pulses: +2 palpable, equal bilaterally     Labs:   Recent Labs     09/16/22  0544 09/17/22  0533   WBC 7.2 6.4   HGB 9.8* 9.7*   HCT 31.1* 30.2*    202     Recent Labs     09/16/22  0544 09/17/22  0533    144   K 3.1* 3.7   * 115*   CO2 19* 20*   BUN 13 10   CREATININE 0.5 0.6   CALCIUM 8.1* 8.3*     No results for input(s): AST, ALT, BILIDIR, BILITOT, ALKPHOS in the last 72 hours. No results for input(s): INR in the last 72 hours. No results for input(s): Jelena Dross in the last 72 hours.     Microbiology:    Blood culture #1: No results found for: BC    Blood culture #2:No results found for: Marshall Bladen    Organism:No results found for: ORG    No results found for: LABGRAM    MRSA culture only:No results found for: [facility]    Urine culture: No results found for: LABURIN    Respiratory culture: No results found for: CULTRESP    Aerobic and Anaerobic :  No results found for: LABAERO  No results found for: LABANAE    Urinalysis:    No results found for: Kari Ryley, BACTERIA, RBCUA, BLOODU, [address], [address]    Radiology:  VL DUP UPPER EXTREMITY VENOUS BILATERAL   Final Result   1. No evidence of deep venous thrombosis within the bilateral upper    extremities. This document has been electronically signed by: Fadumo Agudelo MD on    09/16/2022 08:43 PM      Technique Used: Duplex examination performed utilizing grayscale, color    and spectral analysis. XR ABDOMEN FOR NG/OG/NE TUBE PLACEMENT   Final Result   Impression:   Enteric tube with tip in the proximal stomach. Other findings as above. This document has been electronically signed by: Ruben Parsons MD on    09/15/2022 02:35 AM      XR ABDOMEN (KUB) (SINGLE AP VIEW)   Final Result   Impression:   Dilated loops of small bowel, with contrast visualized down to    rectosigmoid colon, likely reflecting partial small bowel obstruction. This document has been electronically signed by: Ruben Parsons MD on    09/14/2022 11:04 PM      XR CHEST PORTABLE   Final Result   1. New enteric tube with the tip in the stomach. 2. Abnormal density in the right and to lesser extent left lower lobes consistent with infiltrates and/or effusions. 3. Atherosclerotic calcification in the aortic arch. 4. Thoracic spondylosis. .    5. Surgical clips in the right upper quadrant consistent with previous cholecystectomy. **This report has been created using voice recognition software. It may contain minor errors which are inherent in voice recognition technology. **      Final report electronically signed by DR Danii Quevedo on 9/14/2022 11:28 AM      XR ABDOMEN (KUB) (SINGLE AP VIEW)   Final Result   1. Dilated loops of small bowel seen centrally with gas within the colon. Findings can relate to partial small bowel obstruction or ileus. **This report has been created using voice recognition software.   It may contain minor errors which are inherent in voice recognition technology. **      Final report electronically signed by Dr Amarilis Lagos on 9/14/2022 8:18 AM        XR ABDOMEN (KUB) (SINGLE AP VIEW)    Result Date: 9/14/2022  Abdominal x-ray: 1 view. Indication: Small bowel obstruction. Comparison: ANDREW CHAVEZ - XR ABDOMEN (KUB) (SINGLE AP VIEW) - 09/14/2022 07:35 AM EDT Findings: No gross organomegaly. No gross pneumoperitoneum. Enteric tube, with tip in the mid stomach. Oral contrast in the stomach. Oral contrast identified in the left hemicolon down to rectosigmoid colon Dilated loops of small bowel again identified in the central abdomen. Thoracolumbar degenerative disc disease. Partially imaged left femoral fixation nail/screw. Lower lumbar spine posterior fusion. Prior cholecystectomy. Impression: Dilated loops of small bowel, with contrast visualized down to rectosigmoid colon, likely reflecting partial small bowel obstruction. This document has been electronically signed by: May Hoyos MD on 09/14/2022 11:04 PM    XR ABDOMEN (KUB) (SINGLE AP VIEW)    Result Date: 9/14/2022  PROCEDURE: XR ABDOMEN (KUB) (SINGLE AP VIEW) CLINICAL INFORMATION: follow up SBO COMPARISON: None TECHNIQUE: AP supine abdomen performed. FINDINGS: Dilated loops of small bowel seen centrally with gas within the colon. No free air. The bones are demineralized. Degenerative changes of the thoracolumbar spine. Degenerative changes of both hips. Surgical hardware is seen in the left femur. Pedicle screws and roderick fixation seen in the lower lumbar spine. Prior cholecystectomy. 1. Dilated loops of small bowel seen centrally with gas within the colon. Findings can relate to partial small bowel obstruction or ileus. **This report has been created using voice recognition software. It may contain minor errors which are inherent in voice recognition technology. ** Final report electronically signed by Dr Amarilis Lagos on 9/14/2022 8:18 AM    XR CHEST PORTABLE    Result Date: 9/14/2022  PROCEDURE: XR CHEST PORTABLE CLINICAL INFORMATION: Confirmation of course of NG/OG/NE tube and location of tip of tube. COMPARISON: KUB obtained on the same day. . TECHNIQUE: AP upright view of the chest. FINDINGS: There is a new enteric tube with the tip in the stomach. The heart size is normal. There is atherosclerotic calcification in the aortic arch. .  There is abnormal density in the right lower lobe and to lesser extent left lower lobe consistent with infiltrates and/or effusions. There is evidence for granulomatous disease in left midlung field. The pulmonary vascularity is normal. There is thoracic spondylosis. There are degenerative changes involving the glenohumeral and acromioclavicular joints bilaterally. There are surgical clips in the right upper quadrant consistent with previous cholecystectomy. 1. New enteric tube with the tip in the stomach. 2. Abnormal density in the right and to lesser extent left lower lobes consistent with infiltrates and/or effusions. 3. Atherosclerotic calcification in the aortic arch. 4. Thoracic spondylosis. . 5. Surgical clips in the right upper quadrant consistent with previous cholecystectomy. **This report has been created using voice recognition software. It may contain minor errors which are inherent in voice recognition technology. ** Final report electronically signed by DR Jana Davis on 9/14/2022 11:28 AM      Electronically signed by Tono Grullon PA-C on 9/18/2022 at 3:39 PM

## 2022-09-19 VITALS
HEART RATE: 66 BPM | SYSTOLIC BLOOD PRESSURE: 140 MMHG | WEIGHT: 130.9 LBS | RESPIRATION RATE: 16 BRPM | HEIGHT: 60 IN | TEMPERATURE: 98.2 F | DIASTOLIC BLOOD PRESSURE: 63 MMHG | BODY MASS INDEX: 25.7 KG/M2 | OXYGEN SATURATION: 95 %

## 2022-09-19 PROBLEM — I10 UNCONTROLLED HYPERTENSION: Status: RESOLVED | Noted: 2022-09-16 | Resolved: 2022-09-19

## 2022-09-19 PROBLEM — K56.609 SBO (SMALL BOWEL OBSTRUCTION) (HCC): Status: RESOLVED | Noted: 2022-09-13 | Resolved: 2022-09-19

## 2022-09-19 PROBLEM — K56.609 SMALL BOWEL OBSTRUCTION (HCC): Status: RESOLVED | Noted: 2022-09-13 | Resolved: 2022-09-19

## 2022-09-19 LAB
MAGNESIUM: 1.2 MG/DL (ref 1.6–2.4)
SARS-COV-2, NAAT: NOT  DETECTED

## 2022-09-19 PROCEDURE — 99239 HOSP IP/OBS DSCHRG MGMT >30: CPT | Performed by: NURSE PRACTITIONER

## 2022-09-19 PROCEDURE — 6370000000 HC RX 637 (ALT 250 FOR IP): Performed by: PHYSICIAN ASSISTANT

## 2022-09-19 PROCEDURE — 83735 ASSAY OF MAGNESIUM: CPT

## 2022-09-19 PROCEDURE — 87635 SARS-COV-2 COVID-19 AMP PRB: CPT

## 2022-09-19 PROCEDURE — APPSS30 APP SPLIT SHARED TIME 16-30 MINUTES: Performed by: NURSE PRACTITIONER

## 2022-09-19 PROCEDURE — 6370000000 HC RX 637 (ALT 250 FOR IP): Performed by: SURGERY

## 2022-09-19 PROCEDURE — 99238 HOSP IP/OBS DSCHRG MGMT 30/<: CPT | Performed by: SURGERY

## 2022-09-19 PROCEDURE — 6360000002 HC RX W HCPCS: Performed by: NURSE PRACTITIONER

## 2022-09-19 PROCEDURE — 6360000002 HC RX W HCPCS: Performed by: PHYSICIAN ASSISTANT

## 2022-09-19 PROCEDURE — 36415 COLL VENOUS BLD VENIPUNCTURE: CPT

## 2022-09-19 RX ORDER — AMLODIPINE BESYLATE 10 MG/1
10 TABLET ORAL DAILY
Qty: 30 TABLET | Refills: 3 | DISCHARGE
Start: 2022-09-20

## 2022-09-19 RX ORDER — GENTAMICIN SULFATE 3 MG/ML
1 SOLUTION/ DROPS OPHTHALMIC 3 TIMES DAILY
Qty: 1 EACH | Refills: 0
Start: 2022-09-19 | End: 2022-09-22

## 2022-09-19 RX ADMIN — AMLODIPINE BESYLATE 10 MG: 10 TABLET ORAL at 08:16

## 2022-09-19 RX ADMIN — ENOXAPARIN SODIUM 40 MG: 100 INJECTION SUBCUTANEOUS at 08:16

## 2022-09-19 RX ADMIN — MAGNESIUM SULFATE HEPTAHYDRATE 2000 MG: 40 INJECTION, SOLUTION INTRAVENOUS at 11:08

## 2022-09-19 RX ADMIN — METOPROLOL TARTRATE 50 MG: 50 TABLET, FILM COATED ORAL at 08:16

## 2022-09-19 RX ADMIN — LISINOPRIL 40 MG: 40 TABLET ORAL at 08:16

## 2022-09-19 RX ADMIN — HYDRALAZINE HYDROCHLORIDE 25 MG: 25 TABLET, FILM COATED ORAL at 06:27

## 2022-09-19 RX ADMIN — MAGNESIUM SULFATE HEPTAHYDRATE 2000 MG: 40 INJECTION, SOLUTION INTRAVENOUS at 09:07

## 2022-09-19 RX ADMIN — GENTAMICIN SULFATE 1 DROP: 3 SOLUTION OPHTHALMIC at 08:16

## 2022-09-19 RX ADMIN — PANTOPRAZOLE SODIUM 40 MG: 40 TABLET, DELAYED RELEASE ORAL at 06:27

## 2022-09-19 NOTE — DISCHARGE SUMMARY
Discharge Summary     Patient Identification:  Allie Rubalcava  : 1933  MRN: [id number]   Account: [de-identified]     Admit date: 2022  Discharge date: 2022   Attending provider: Alfred Phillips MD        Primary care provider: Terence Kaur MD     Discharge Diagnoses:   Principal Problem (Resolved):    SBO (small bowel obstruction) ([facility] [address]  Active Problems:    * No active hospital problems. *  Resolved Problems:    Small bowel obstruction [facility])    Uncontrolled hypertension       Hospital Course:   Allie Rubalcava is a 80 y.o. female admitted to [address] [place] [address] on 2022 for small bowel obstruction. She was admitted to [address] [place] [address]  and was initially managed with nasogastric decompression, bowel rest, analgesics for pain control, IV fluid hydration, GI and DVT prophylaxis. On day 3 of admission she had periorbital cellulitis and was started on gentamycin drops. She showed improvement on day 2 of treatment. She did have issues with  hypertension, Hospitalist consult was initiated to assist in management. Over the hospital stay she slowly improved in ability to tolerate po fluids, solid foods with evidence of returning bowel function, and spontaneously voiding. She is deconditioned since she was not getting OOB and would benefit from skilled therapy. She was seen by speech for dysphagia and recommendations were added to discharge. At the time of discharge she was able to tolerate pain with oral analgesic and was medically stable.           Procedures:   none    Code Status: Full Code     Consults:   Hospitalist     Examination:  Vitals:  Vitals:    22 2056 22 2341 22 0423 22 0800   BP: (!) 160/73 (!) 126/59 (!) 140/63    Pulse: 69 69 61 66   Resp: 16 17 17 16   Temp: 97.8 °F (36.6 °C) 97.9 °F (36.6 °C) 97.6 °F (36.4 °C) 98.2 °F (36.8 °C)   TempSrc: Oral Axillary Oral Oral   SpO2: 95% 95% 98% 95%   Weight:       Height:         Weight: Weight: 130 lb 14.4 oz (59.4 kg)     24 Results (from the past 72 hour(s))   Magnesium    Collection Time: 09/17/22  5:33 AM   Result Value Ref Range    Magnesium 1.5 (L) 1.6 - 2.4 mg/dL   Basic Metabolic Panel    Collection Time: 09/17/22  5:33 AM   Result Value Ref Range    Sodium 144 135 - 145 meq/L    Potassium 3.7 3.5 - 5.2 meq/L    Chloride 115 (H) 98 - 111 meq/L    CO2 20 (L) 23 - 33 meq/L    Glucose 98 70 - 108 mg/dL    BUN 10 7 - 22 mg/dL    Creatinine 0.6 0.4 - 1.2 mg/dL    Calcium 8.3 (L) 8.5 - 10.5 mg/dL   CBC    Collection Time: 09/17/22  5:33 AM   Result Value Ref Range    WBC 6.4 4.8 - 10.8 thou/mm3    RBC 2.96 (L) 4.20 - 5.40 mill/mm3    Hemoglobin 9.7 (L) 12.0 - 16.0 gm/dl    Hematocrit 30.2 (L) 37.0 - 47.0 %    .0 (H) 81.0 - 99.0 fL    MCH 32.8 26.0 - 33.0 pg    MCHC 32.1 (L) 32.2 - 35.5 gm/dl    RDW-CV 14.2 11.5 - 14.5 %    RDW-SD 53.1 (H) 35.0 - 45.0 fL    Platelets 710 773 - 720 thou/mm3    MPV 11.4 9.4 - 12.4 fL   Anion Gap    Collection Time: 09/17/22  5:33 AM   Result Value Ref Range    Anion Gap 9.0 8.0 - 16.0 meq/L   Glomerular Filtration Rate, Estimated    Collection Time: 09/17/22  5:33 AM   Result Value Ref Range    Est, Glom Filt Rate >90 ml/min/1.73m2   Magnesium    Collection Time: 09/19/22  6:03 AM   Result Value Ref Range    Magnesium 1.2 (L) 1.6 - 2.4 mg/dL   COVID-19, Rapid    Collection Time: 09/19/22  9:40 AM    Specimen: Nasopharyngeal Swab   Result Value Ref Range    SARS-CoV-2, NAAT NOT  DETECTED NOT DETECTED       Patient Instructions:    Keep bowels moving and soft    Follow Speech therapy recommendations for meals and medications     Recommend PT in SNF, physical deconditioning since admission   Good nutrition is important when healing from an illness, injury, or surgery. Follow any nutrition recommendations given to you during your hospital stay. If you were given an oral nutrition supplement while in the hospital, continue to take this supplement at home.   You can take it with meals, in-between meals, and/or before bedtime. These supplements can be purchased at most local grocery stores, pharmacies, and chain super-stores. If you have any questions about your diet or nutrition, call the hospital and ask for the dietitian. Speech therapy Recommendation:  currently on Dysphagia bite size soft diet      RECOMMENDATIONS/ASSESSMENT:  Instrumental Evaluation: Instrumental evaluation not indicated at this time. Diet Recommendations:  Full Liquids - medications in puree   Strategies:  Full Upright Position, Small Bite/Sip, Pulmonary Monitoring, Medication in Applesauce, Limit Distractions, and Monitor for Fatigue        Rehabilitation Potential: fair  Discharge Recommendations: SNF     EDUCATION:  Learner: Patient  Education:  Reviewed results and recommendations of this evaluation, Reviewed diet and strategies, Reviewed signs, symptoms and risks of aspiration, Education Related to Potential Risks and Complications Due to Impairment/Illness/Injury, Education Related to Prevention of Recurrence of Impairment/Illness/Injury, and Education Related to [employer] and Wellness  Evaluation of Education: [employer] understanding and Needs further instruction     PLAN:  Skilled SLP intervention on acute care 3-5 x per week or until goals met and/or pt plateaus in function. Diet: ADULT DIET; Dysphagia - Soft and Bite Sized      Follow-up visits:   [address] [place] [address] [phone] Mihaela Le MD  [address] [facility] [address] [phone]    Follow up      Nima Hogan, [address]  [facility]  [address] [facility]  [phone]    Follow up  As needed       Discharge condition: fair  Disposition:  To a non-[facility] facility  Time spent on discharge: 35 minutes     Discharge Medications:     Medication List        START taking these medications      amLODIPine 10 MG tablet  Commonly known as: NORVASC  Take 1 tablet by mouth daily  Start taking on: September 20, 2022     gentamicin 0.3 % ophthalmic solution  Commonly known as: GARAMYCIN  Place 1 drop into both eyes 3 times daily for 8 doses            CONTINUE taking these medications      acetaminophen 325 MG tablet  Commonly known as: TYLENOL     allopurinol 300 MG tablet  Commonly known as: ZYLOPRIM     aspirin 81 MG EC tablet     docusate sodium 100 MG capsule  Commonly known as: COLACE     lisinopril 20 MG tablet  Commonly known as: PRINIVIL;ZESTRIL     melatonin 3 MG Tabs tablet     metoprolol succinate 50 MG extended release tablet  Commonly known as: TOPROL XL     Omeprazole 20 MG Tbdd     ondansetron 4 MG tablet  Commonly known as: ZOFRAN     rosuvastatin 5 MG tablet  Commonly known as: CRESTOR     therapeutic multivitamin-minerals tablet               Where to Get Your Medications        Information about where to get these medications is not yet available    Ask your nurse or doctor about these medications  amLODIPine 10 MG tablet  gentamicin 0.3 % ophthalmic solution       Patient seen and examined independently by me 9/19/2022     I personally supervised the PA/NP in the evaluation, management and development of the treatment plan for Beryl Knowles  on the same date of service as above. I personally interviewed Beryl Knowles   and  discussed his review of symptoms as able due to the patient's condition, as well as performed an individual physical exam on the same   date of service as above. In addition I discussed the patient's condition and treatment options with the patient, if able, and/or designated family if available. I have also reviewed and agree with the past medical,  family and social history updates as well as care plans unless otherwise noted below. All questions were answered. I examined independently and reviewed relevant data myself and may have done so in the context of team rounds. A full chart review was performed by me.        I attest that this medical record entry accurately reflects signatures and notations that I made in my capacity as an M. D. when I treated and diagnosed Kristine Hutchison on the date of service above     I was responsible for all medical decision making involving this encounter. I identified and/or confirmed all problems associated with this patient encounter by my own direct physical examination of this patient and review of all radiology studies and labwork  that were ordered and available. There are no active hospital problems to display for this patient. I  discussed the management of all of the identified problems with the APN or PA. I formulated the treatment plan for all identified problems and discussed those with the APN or PA . This management plan was then carried out and the patient's orders for care by the APN or PA. Total time personally spent on this patient encounter was < 30minutes which includes :  Preparing to see the patient( reviewing tests and chart)  Obtaining and reviewing separately obtained history  Performing a medically appropriate examination and evaluation  Ordering medications, tests, or procedures  Counseling and educating the patient/family/caregiver  Care coordination  Referring and communicating with other healthcare professionals  Documenting clinical information in the EHR  Independent interpretation of results and communicating the results to patient and care team  This includes a direct physical exam as well as all the other encounter activities described above. Time may be discontiguous. Time does not include procedures. Please see our orders that were directed and approved by me if there are any new ones for the updated patient care plan. Above discussed and I agree with documentation and orders placed by Guido Sylvester, CNP    See any additional comments if needed below for any other updated orders and plans.       Daily Progress Note:  Patient is doing well and anxious to get back to her SNF with her . She is tolerating meals, limited intake. Bowels are returning to normal. Denies chest pain and SOB. Abd is soft, BS are active and denies N&V. No follow up necessary   Electronically signed by TERRY Gaines CNP on 9/19/2022 at 11:46 AM                                             Patient seen and examined independently by me. Above discussed and I agree with CNP. Labs, cultures, and radiographs where available were reviewed. See orders for the updated patient care plan. Julita Craven MD MD, resuming care from the weekend.   Chart reviewed all of patient's complete body pain is all gone presumably due to possible muscle cramping he is tolerating a diet abdomen is soft patient is normally incontinent of her stool due to her cauda equina syndrome daughter is at the bedside okay to discharge back to nursing home today we will follow-up as needed  9/19/2022   2:06 PM

## 2022-09-19 NOTE — CARE COORDINATION
9/19/22, 7:08 AM EDT    DISCHARGE ON [address] day: 6  Location: 8A-11/011-A Reason for admit: SBO (small bowel obstruction) ([facility] [address] [K56.609]  Small bowel obstruction ([facility] [address] [J85.636]   Procedure: No.  Barriers to Discharge: Maintained over weekend with conservative tx. Gen Surg cont to follow. PT/OT/ST cont. IVF at 20/hr. BP improved. This am 140/63. Per Gen Surg note yesterday, pt reportedly feeling worse. CM revisit today. Dtr present. Pt smiling and looking forward to discharge today. PCP: Christine Kinsey MD  Readmission Risk Score: 13.9%  Patient Goals/Plan/Treatment Preferences: From [facility]. Family to transport. Pt verbalized understanding and gave permission for possible discharge within 4 hours of receiving IMM.

## 2022-09-19 NOTE — DISCHARGE INSTR - DIET
Good nutrition is important when healing from an illness, injury, or surgery. Follow any nutrition recommendations given to you during your hospital stay. If you were given an oral nutrition supplement while in the hospital, continue to take this supplement at home. You can take it with meals, in-between meals, and/or before bedtime. These supplements can be purchased at most local grocery stores, pharmacies, and chain [employer]-stores. If you have any questions about your diet or nutrition, call the hospital and ask for the dietitian. Speech therapy Recommendation:  currently on Dysphagia bite size soft diet      RECOMMENDATIONS/ASSESSMENT:  Instrumental Evaluation: Instrumental evaluation not indicated at this time. Diet Recommendations:  Full Liquids - medications in puree   Strategies:  Full Upright Position, Small Bite/Sip, Pulmonary Monitoring, Medication in Applesauce, Limit Distractions, and Monitor for Fatigue        Rehabilitation Potential: fair  Discharge Recommendations: SNF     EDUCATION:  Learner: Patient  Education:  Reviewed results and recommendations of this evaluation, Reviewed diet and strategies, Reviewed signs, symptoms and risks of aspiration, Education Related to Potential Risks and Complications Due to Impairment/Illness/Injury, Education Related to Prevention of Recurrence of Impairment/Illness/Injury, and Education Related to [employer] and Wellness  Evaluation of Education: [employer] understanding and Needs further instruction     PLAN:  Skilled SLP intervention on acute care 3-5 x per week or until goals met and/or pt plateaus in function.

## 2022-09-19 NOTE — PLAN OF CARE
Problem: Safety - Adult  Goal: Free from fall injury  Outcome: Progressing     Problem: Nutrition Deficit:  Goal: Optimize nutritional status  Outcome: Progressing     Problem: Pain  Goal: Verbalizes/displays adequate comfort level or baseline comfort level  Outcome: Progressing     Problem: Skin/Tissue Integrity - Adult  Goal: Skin integrity remains intact  Outcome: Progressing     Problem: Skin/Tissue Integrity - Adult  Goal: Oral mucous membranes remain intact  Outcome: Progressing     Problem: Musculoskeletal - Adult  Goal: Return ADL status to a safe level of function  Outcome: Progressing

## 2022-09-19 NOTE — PROGRESS NOTES
IV removed. Assisted patient getting dressed. Educated on discharge instructions. Patient and patient's daughter verbalized understanding, all questions answered. Wheeled down to discharge doors and assisted patient into vehicle. Chart broken down and placed in  yellow bin.

## 2022-09-21 NOTE — PROGRESS NOTES
Physician Progress Note      PATIENT:               Valery Chaney  CSN #:                  [id number]  :                       1933  ADMIT DATE:       2022 1:20 PM  [address] [place] DATE:        2022 2:19 PM  RESPONDING  PROVIDER #:        Marco A Nathan CNP          QUERY TEXT:    Patient admitted with SBO. Noted to have hital hernia. Please document in   progress notes and discharge summary the cause of the bowel obstruction:[[SBO   not related to hiatal hernia[de-identified] This patient has SBO not related to hiatal   hernia    The medical record reflects the following:  Risk Factors: Elderly,chronic diarrhea  Clinical Indicators: pt found to have hiatal hernia  Treatment: NG, conservative treatment, IVF, imaging , lab monitoring    Thank You!   Martell Martinez RN, CRCR  RN Clinical Documentation Integrity  (A) [phone] (C) [phone]  Options provided:  -- SBO related to present hiatal hernia  -- Other - I will add my own diagnosis  -- Disagree - Not applicable / Not valid  -- Disagree - Clinically unable to determine / Unknown  -- Refer to Clinical Documentation Reviewer    PROVIDER RESPONSE TEXT:    Patient has a SBO not related to hiatal hernia    Query created by: Hillary Mireles on 2022 12:11 PM      Electronically signed by:  Marco A Nathan CNP 2022 12:34 PM

## (undated) DEVICE — PACK PROCEDURE SURG PLAS SC MIN SRHP LF

## (undated) DEVICE — GLOVE SURG SZ 8 L11.77IN FNGR THK9.8MIL STRW LTX POLYMER

## (undated) DEVICE — HYPODERMIC SAFETY NEEDLE: Brand: MAGELLAN

## (undated) DEVICE — GLOVE ORANGE PI 7   MSG9070

## (undated) DEVICE — SPONGE GZ W4XL4IN COT 12 PLY TYP VII WVN C FLD DSGN

## (undated) DEVICE — COTTON BALL ST

## (undated) DEVICE — GAUZE,SPONGE,4"X4",12PLY,STERILE,LF,2'S: Brand: MEDLINE

## (undated) DEVICE — BANDAGE,GAUZE,BULKEE II,4.5"X4.1YD,STRL: Brand: MEDLINE

## (undated) DEVICE — BANDAGE COMPR REINF 10 YDX4 IN REB ELASTIC STRL LF

## (undated) DEVICE — GOWN,SIRUS,NON REINFRCD,LARGE,SET IN SL: Brand: MEDLINE

## (undated) DEVICE — ADHESIVE SKIN CLOSURE FLX 0.5 CC FOR TISS SEAL HISTOACRYL

## (undated) DEVICE — INTENDED FOR TISSUE SEPARATION, AND OTHER PROCEDURES THAT REQUIRE A SHARP SURGICAL BLADE TO PUNCTURE OR CUT.: Brand: BARD-PARKER ® CARBON RIB-BACK BLADES